# Patient Record
Sex: MALE | Race: WHITE | URBAN - METROPOLITAN AREA
[De-identification: names, ages, dates, MRNs, and addresses within clinical notes are randomized per-mention and may not be internally consistent; named-entity substitution may affect disease eponyms.]

---

## 2018-05-02 ENCOUNTER — OP HISTORICAL/CONVERTED ENCOUNTER (OUTPATIENT)
Dept: OTHER | Age: 68
End: 2018-05-02

## 2020-08-11 VITALS
OXYGEN SATURATION: 98 % | BODY MASS INDEX: 30.34 KG/M2 | SYSTOLIC BLOOD PRESSURE: 130 MMHG | WEIGHT: 224 LBS | RESPIRATION RATE: 12 BRPM | HEART RATE: 86 BPM | DIASTOLIC BLOOD PRESSURE: 90 MMHG | HEIGHT: 72 IN | TEMPERATURE: 97 F

## 2020-08-13 ENCOUNTER — OFFICE VISIT (OUTPATIENT)
Dept: PRIMARY CARE CLINIC | Age: 70
End: 2020-08-13
Payer: MEDICARE

## 2020-08-13 VITALS
OXYGEN SATURATION: 98 % | HEART RATE: 70 BPM | BODY MASS INDEX: 30.38 KG/M2 | HEIGHT: 72 IN | TEMPERATURE: 97.1 F | DIASTOLIC BLOOD PRESSURE: 82 MMHG | SYSTOLIC BLOOD PRESSURE: 144 MMHG

## 2020-08-13 DIAGNOSIS — E11.9 TYPE 2 DIABETES MELLITUS WITHOUT COMPLICATION, WITHOUT LONG-TERM CURRENT USE OF INSULIN (HCC): Primary | Chronic | ICD-10-CM

## 2020-08-13 DIAGNOSIS — E78.2 MIXED HYPERLIPIDEMIA: Chronic | ICD-10-CM

## 2020-08-13 DIAGNOSIS — I10 ESSENTIAL (PRIMARY) HYPERTENSION: Chronic | ICD-10-CM

## 2020-08-13 PROBLEM — E78.5 HYPERLIPIDEMIA: Status: ACTIVE | Noted: 2020-08-13

## 2020-08-13 PROCEDURE — 99214 OFFICE O/P EST MOD 30 MIN: CPT | Performed by: FAMILY MEDICINE

## 2020-08-13 RX ORDER — PETROLATUM,WHITE/LANOLIN
500 OINTMENT (GRAM) TOPICAL 3 TIMES DAILY
COMMUNITY

## 2020-08-13 RX ORDER — LOSARTAN POTASSIUM 50 MG/1
50 TABLET ORAL DAILY
COMMUNITY
End: 2020-08-13 | Stop reason: SDUPTHER

## 2020-08-13 RX ORDER — METFORMIN HYDROCHLORIDE 500 MG/1
500 TABLET ORAL DAILY
Qty: 90 TAB | Refills: 1 | Status: SHIPPED | OUTPATIENT
Start: 2020-08-13 | End: 2021-02-22 | Stop reason: SDUPTHER

## 2020-08-13 RX ORDER — ACYCLOVIR 200 MG/1
400 CAPSULE ORAL DAILY
Qty: 180 CAP | Refills: 3 | Status: SHIPPED | OUTPATIENT
Start: 2020-08-13 | End: 2022-05-25 | Stop reason: SDUPTHER

## 2020-08-13 RX ORDER — SIMVASTATIN 40 MG/1
40 TABLET, FILM COATED ORAL DAILY
COMMUNITY
End: 2020-08-13 | Stop reason: SDUPTHER

## 2020-08-13 RX ORDER — SIMVASTATIN 40 MG/1
40 TABLET, FILM COATED ORAL DAILY
Qty: 90 TAB | Refills: 1 | Status: SHIPPED | OUTPATIENT
Start: 2020-08-13 | End: 2021-02-22 | Stop reason: SDUPTHER

## 2020-08-13 RX ORDER — LOSARTAN POTASSIUM 100 MG/1
100 TABLET ORAL DAILY
Qty: 90 TAB | Refills: 1 | Status: SHIPPED | OUTPATIENT
Start: 2020-08-13 | End: 2021-02-22 | Stop reason: SDUPTHER

## 2020-08-13 RX ORDER — AMLODIPINE BESYLATE 10 MG/1
10 TABLET ORAL DAILY
COMMUNITY
End: 2020-08-13 | Stop reason: SDUPTHER

## 2020-08-13 RX ORDER — AMLODIPINE BESYLATE 10 MG/1
10 TABLET ORAL DAILY
Qty: 90 TAB | Refills: 1 | Status: SHIPPED | OUTPATIENT
Start: 2020-08-13 | End: 2021-02-22 | Stop reason: SDUPTHER

## 2020-08-13 RX ORDER — GUAIFENESIN 100 MG/5ML
81 LIQUID (ML) ORAL AS NEEDED
COMMUNITY

## 2020-08-13 RX ORDER — METFORMIN HYDROCHLORIDE 500 MG/1
500 TABLET ORAL DAILY
COMMUNITY
End: 2020-08-13 | Stop reason: SDUPTHER

## 2020-08-13 RX ORDER — ACYCLOVIR 200 MG/1
CAPSULE ORAL
COMMUNITY
End: 2020-08-13

## 2020-08-13 RX ORDER — VITAMIN E 268 MG
400 CAPSULE ORAL DAILY
COMMUNITY

## 2020-08-13 NOTE — PROGRESS NOTES
HISTORY OF PRESENT ILLNESS  Diabetes:  DM follow up appt  FBS   Last  Eye exam:2019, neg       PT is taking metformin 500 every day with dinner. There is no medication concerns. Denies any neurological symptoms , and cuts heal well. Checks feet at home. Hypertension:  PT taking norvasc 10 and losartan 100. Not checking blood pressure at home.       hyperlipidemia: On Zocor 40 mg, no myalgias, tolerating well. Been active and reports feeling well. Kj Junior is a 65 y.o. male. Past Medical History:   Diagnosis Date    Allergies     Anxiety     Arthritis     Spine     Diabetes [facility] [address]     GERD (gastroesophageal reflux disease)     Herpes     Hypertension     Prostate CA ([facility] [address]      Social History     Tobacco Use    Smoking status: Never Smoker    Smokeless tobacco: Never Used   Substance Use Topics    Alcohol use: Yes    Drug use: Not on file       Family History   Problem Relation Age of Onset    Diabetes Mother     Alzheimer Father     Coronary Artery Disease Other     Other Other        Review of Systems   Constitutional: Negative. Negative for chills, fever, malaise/fatigue and weight loss. Eyes: Negative for blurred vision and pain. Respiratory: Negative for cough and shortness of breath. Cardiovascular: Negative for chest pain, palpitations, orthopnea, leg swelling and PND. Gastrointestinal: Negative for abdominal pain, diarrhea, heartburn and nausea. Genitourinary: Negative for dysuria and frequency. Musculoskeletal: Negative for falls, joint pain and myalgias. Neurological: Negative for dizziness, tremors, weakness and headaches. Endo/Heme/Allergies: Negative for polydipsia. Psychiatric/Behavioral: Negative for depression. The patient is not nervous/anxious and does not have insomnia. Physical Exam  Vitals signs and nursing note reviewed. Constitutional:       General: He is not in acute distress.      Appearance: Normal appearance. He is obese. He is not ill-appearing. HENT:      Head: Normocephalic and atraumatic. Mouth/Throat:      Mouth: Mucous membranes are moist.      Pharynx: No oropharyngeal exudate or posterior oropharyngeal erythema. Eyes:      General: No scleral icterus. Right eye: No discharge. Left eye: No discharge. Extraocular Movements: Extraocular movements intact. Conjunctiva/sclera: Conjunctivae normal.      Pupils: Pupils are equal, round, and reactive to light. Neck:      Musculoskeletal: No muscular tenderness. Cardiovascular:      Rate and Rhythm: Normal rate and regular rhythm. Pulses: Normal pulses. Heart sounds: Normal heart sounds. Pulmonary:      Effort: Pulmonary effort is normal.      Breath sounds: Normal breath sounds. Abdominal:      General: Abdomen is flat. There is no distension. Palpations: Abdomen is soft. There is no mass. Tenderness: There is no abdominal tenderness. There is no right CVA tenderness or left CVA tenderness. Musculoskeletal:      Right lower leg: No edema. Left lower leg: No edema. Lymphadenopathy:      Cervical: No cervical adenopathy. Skin:     General: Skin is warm and dry. Capillary Refill: Capillary refill takes less than 2 seconds. Neurological:      General: No focal deficit present. Mental Status: He is alert and oriented to person, place, and time. Cranial Nerves: No cranial nerve deficit. Deep Tendon Reflexes: Reflexes normal.   Psychiatric:         Mood and Affect: Mood normal.         Behavior: Behavior normal.         Thought Content: Thought content normal.         Judgment: Judgment normal.           ASSESSMENT and PLAN  Diagnoses and all orders for this visit:    1. Type 2 diabetes mellitus without complication, without long-term current use of insulin ([facility])  Comments:  sounds controlled  Orders:  -     metFORMIN (GLUCOPHAGE) 500 mg tablet;  Take 1 Tab by mouth daily.  -     METABOLIC PANEL, COMPREHENSIVE  -     CBC WITH AUTOMATED DIFF  -     HEMOGLOBIN A1C WITH EAG  -     LIPID PANEL    2. Essential (primary) hypertension  Comments:  near goal.   Start tracking blood pressure at home. Goal less than 140/90  Orders:  -     losartan (COZAAR) 100 mg tablet; Take 1 Tab by mouth daily. Indications: high blood pressure  -     METABOLIC PANEL, COMPREHENSIVE  -     CBC WITH AUTOMATED DIFF  -     LIPID PANEL    3. Mixed hyperlipidemia  Comments: Tolerating well, due for labs  Orders:  -     amLODIPine (NORVASC) 10 mg tablet; Take 1 Tab by mouth daily. -     simvastatin (ZOCOR) 40 mg tablet; Take 1 Tab by mouth daily.  -     METABOLIC PANEL, COMPREHENSIVE  -     LIPID PANEL    Other orders  -     acyclovir (ZOVIRAX) 200 mg capsule; Take 2 Caps by mouth daily. Indications: a herpes simplex infection         Orders Placed This Encounter    METABOLIC PANEL, COMPREHENSIVE    CBC WITH AUTOMATED DIFF    HEMOGLOBIN A1C WITH EAG    LIPID PANEL    aspirin 81 mg chewable tablet    DISCONTD: metFORMIN (GLUCOPHAGE) 500 mg tablet    DISCONTD: losartan (COZAAR) 50 mg tablet    DISCONTD: amLODIPine (NORVASC) 10 mg tablet    glucosamine sulfate 500 mg capsule    omega 3-dha-epa-fish oil 100-160-1,000 mg cap    DISCONTD: simvastatin (ZOCOR) 40 mg tablet    vitamin E (AQUA GEMS) 400 unit capsule    amLODIPine (NORVASC) 10 mg tablet    losartan (COZAAR) 100 mg tablet    metFORMIN (GLUCOPHAGE) 500 mg tablet    simvastatin (ZOCOR) 40 mg tablet    DISCONTD: acyclovir (ZOVIRAX) 200 mg capsule    acyclovir (ZOVIRAX) 200 mg capsule     Follow-up and Dispositions    · Return in about 6 months (around 2/13/2021) for Chronic office visit.

## 2020-08-14 LAB
ALBUMIN SERPL-MCNC: 4.6 G/DL (ref 3.7–4.7)
ALBUMIN/GLOB SERPL: 2.2 {RATIO} (ref 1.2–2.2)
ALP SERPL-CCNC: 84 IU/L (ref 39–117)
ALT SERPL-CCNC: 20 IU/L (ref 0–44)
AST SERPL-CCNC: 17 IU/L (ref 0–40)
BASOPHILS # BLD AUTO: 0.1 X10E3/UL (ref 0–0.2)
BASOPHILS NFR BLD AUTO: 1 %
BILIRUB SERPL-MCNC: 0.2 MG/DL (ref 0–1.2)
BUN SERPL-MCNC: 14 MG/DL (ref 8–27)
BUN/CREAT SERPL: 15 (ref 10–24)
CALCIUM SERPL-MCNC: 9.2 MG/DL (ref 8.6–10.2)
CHLORIDE SERPL-SCNC: 104 MMOL/L (ref 96–106)
CHOLEST SERPL-MCNC: 179 MG/DL (ref 100–199)
CO2 SERPL-SCNC: 24 MMOL/L (ref 20–29)
CREAT SERPL-MCNC: 0.93 MG/DL (ref 0.76–1.27)
EOSINOPHIL # BLD AUTO: 0.1 X10E3/UL (ref 0–0.4)
EOSINOPHIL NFR BLD AUTO: 2 %
ERYTHROCYTE [DISTWIDTH] IN BLOOD BY AUTOMATED COUNT: 15.4 % (ref 11.6–15.4)
EST. AVERAGE GLUCOSE BLD GHB EST-MCNC: 128 MG/DL
GLOBULIN SER CALC-MCNC: 2.1 G/DL (ref 1.5–4.5)
GLUCOSE SERPL-MCNC: 87 MG/DL (ref 65–99)
HBA1C MFR BLD: 6.1 % (ref 4.8–5.6)
HCT VFR BLD AUTO: 42.1 % (ref 37.5–51)
HDLC SERPL-MCNC: 46 MG/DL
HGB BLD-MCNC: 13.4 G/DL (ref 13–17.7)
IMM GRANULOCYTES # BLD AUTO: 0 X10E3/UL (ref 0–0.1)
IMM GRANULOCYTES NFR BLD AUTO: 0 %
LDLC SERPL CALC-MCNC: 106 MG/DL (ref 0–99)
LYMPHOCYTES # BLD AUTO: 1.3 X10E3/UL (ref 0.7–3.1)
LYMPHOCYTES NFR BLD AUTO: 18 %
MCH RBC QN AUTO: 27.4 PG (ref 26.6–33)
MCHC RBC AUTO-ENTMCNC: 31.8 G/DL (ref 31.5–35.7)
MCV RBC AUTO: 86 FL (ref 79–97)
MONOCYTES # BLD AUTO: 0.5 X10E3/UL (ref 0.1–0.9)
MONOCYTES NFR BLD AUTO: 7 %
NEUTROPHILS # BLD AUTO: 5.4 X10E3/UL (ref 1.4–7)
NEUTROPHILS NFR BLD AUTO: 72 %
PLATELET # BLD AUTO: 359 X10E3/UL (ref 150–450)
POTASSIUM SERPL-SCNC: 4.6 MMOL/L (ref 3.5–5.2)
PROT SERPL-MCNC: 6.7 G/DL (ref 6–8.5)
RBC # BLD AUTO: 4.89 X10E6/UL (ref 4.14–5.8)
SODIUM SERPL-SCNC: 143 MMOL/L (ref 134–144)
TRIGL SERPL-MCNC: 133 MG/DL (ref 0–149)
VLDLC SERPL CALC-MCNC: 27 MG/DL (ref 5–40)
WBC # BLD AUTO: 7.4 X10E3/UL (ref 3.4–10.8)

## 2020-08-22 RX ORDER — SIMVASTATIN 40 MG/1
TABLET, FILM COATED ORAL
Qty: 90 TAB | OUTPATIENT
Start: 2020-08-22

## 2020-08-22 RX ORDER — AMLODIPINE BESYLATE 10 MG/1
TABLET ORAL
Qty: 90 TAB | OUTPATIENT
Start: 2020-08-22

## 2020-08-22 RX ORDER — LOSARTAN POTASSIUM 50 MG/1
TABLET ORAL
Qty: 90 TAB | OUTPATIENT
Start: 2020-08-22

## 2020-08-22 RX ORDER — METFORMIN HYDROCHLORIDE 500 MG/1
TABLET ORAL
Qty: 90 TAB | OUTPATIENT
Start: 2020-08-22

## 2021-02-22 ENCOUNTER — TELEPHONE (OUTPATIENT)
Dept: PRIMARY CARE CLINIC | Age: 71
End: 2021-02-22

## 2021-02-22 DIAGNOSIS — E11.9 TYPE 2 DIABETES MELLITUS WITHOUT COMPLICATION, WITHOUT LONG-TERM CURRENT USE OF INSULIN (HCC): Chronic | ICD-10-CM

## 2021-02-22 DIAGNOSIS — I10 ESSENTIAL (PRIMARY) HYPERTENSION: Chronic | ICD-10-CM

## 2021-02-22 DIAGNOSIS — E78.2 MIXED HYPERLIPIDEMIA: Chronic | ICD-10-CM

## 2021-02-22 RX ORDER — METFORMIN HYDROCHLORIDE 500 MG/1
500 TABLET ORAL DAILY
Qty: 30 TAB | Refills: 0 | Status: SHIPPED | OUTPATIENT
Start: 2021-02-22 | End: 2021-03-15 | Stop reason: SDUPTHER

## 2021-02-22 RX ORDER — AMLODIPINE BESYLATE 10 MG/1
10 TABLET ORAL DAILY
Qty: 30 TAB | Refills: 0 | Status: SHIPPED | OUTPATIENT
Start: 2021-02-22 | End: 2021-03-15 | Stop reason: SDUPTHER

## 2021-02-22 RX ORDER — LOSARTAN POTASSIUM 100 MG/1
100 TABLET ORAL DAILY
Qty: 30 TAB | Refills: 0 | Status: SHIPPED | OUTPATIENT
Start: 2021-02-22 | End: 2021-03-15 | Stop reason: SDUPTHER

## 2021-02-22 RX ORDER — SIMVASTATIN 40 MG/1
40 TABLET, FILM COATED ORAL DAILY
Qty: 30 TAB | Refills: 0 | Status: SHIPPED | OUTPATIENT
Start: 2021-02-22 | End: 2021-03-15 | Stop reason: SDUPTHER

## 2021-02-22 NOTE — TELEPHONE ENCOUNTER
Patient came to the window today and stated that he needs refills on his zocor glucophage  Amlodipine losartan. If there's any questions please call him at [phone].

## 2021-02-22 NOTE — TELEPHONE ENCOUNTER
No pharmacy requests were on chart. Last seen here 8/2020. Due for appt this month. 30 day supply of all 4 sent.

## 2021-02-22 NOTE — TELEPHONE ENCOUNTER
Sent. He needs an appointment.  If he can't do a virtual than he needs to schedule with a new provider because im sure Dr. Omar Rosales has no more in office avail

## 2021-03-15 ENCOUNTER — OFFICE VISIT (OUTPATIENT)
Dept: PRIMARY CARE CLINIC | Age: 71
End: 2021-03-15
Payer: MEDICARE

## 2021-03-15 VITALS
OXYGEN SATURATION: 98 % | TEMPERATURE: 97.3 F | DIASTOLIC BLOOD PRESSURE: 72 MMHG | RESPIRATION RATE: 16 BRPM | SYSTOLIC BLOOD PRESSURE: 130 MMHG | HEART RATE: 68 BPM | BODY MASS INDEX: 31.42 KG/M2 | HEIGHT: 72 IN | WEIGHT: 232 LBS

## 2021-03-15 DIAGNOSIS — M79.676 PAIN AROUND TOENAIL: Primary | ICD-10-CM

## 2021-03-15 DIAGNOSIS — E78.2 MIXED HYPERLIPIDEMIA: Chronic | ICD-10-CM

## 2021-03-15 DIAGNOSIS — Z11.59 ENCOUNTER FOR HEPATITIS C SCREENING TEST FOR LOW RISK PATIENT: ICD-10-CM

## 2021-03-15 DIAGNOSIS — E11.9 TYPE 2 DIABETES MELLITUS WITHOUT COMPLICATION, WITHOUT LONG-TERM CURRENT USE OF INSULIN (HCC): Chronic | ICD-10-CM

## 2021-03-15 DIAGNOSIS — I10 ESSENTIAL (PRIMARY) HYPERTENSION: Chronic | ICD-10-CM

## 2021-03-15 PROCEDURE — 1101F PT FALLS ASSESS-DOCD LE1/YR: CPT | Performed by: NURSE PRACTITIONER

## 2021-03-15 PROCEDURE — 99214 OFFICE O/P EST MOD 30 MIN: CPT | Performed by: NURSE PRACTITIONER

## 2021-03-15 PROCEDURE — 2022F DILAT RTA XM EVC RTNOPTHY: CPT | Performed by: NURSE PRACTITIONER

## 2021-03-15 PROCEDURE — 3017F COLORECTAL CA SCREEN DOC REV: CPT | Performed by: NURSE PRACTITIONER

## 2021-03-15 PROCEDURE — G8432 DEP SCR NOT DOC, RNG: HCPCS | Performed by: NURSE PRACTITIONER

## 2021-03-15 PROCEDURE — G8752 SYS BP LESS 140: HCPCS | Performed by: NURSE PRACTITIONER

## 2021-03-15 PROCEDURE — G8427 DOCREV CUR MEDS BY ELIG CLIN: HCPCS | Performed by: NURSE PRACTITIONER

## 2021-03-15 PROCEDURE — 3046F HEMOGLOBIN A1C LEVEL >9.0%: CPT | Performed by: NURSE PRACTITIONER

## 2021-03-15 PROCEDURE — G8754 DIAS BP LESS 90: HCPCS | Performed by: NURSE PRACTITIONER

## 2021-03-15 PROCEDURE — G8536 NO DOC ELDER MAL SCRN: HCPCS | Performed by: NURSE PRACTITIONER

## 2021-03-15 PROCEDURE — G8417 CALC BMI ABV UP PARAM F/U: HCPCS | Performed by: NURSE PRACTITIONER

## 2021-03-15 RX ORDER — METFORMIN HYDROCHLORIDE 500 MG/1
500 TABLET ORAL DAILY
Qty: 30 TAB | Refills: 0 | Status: SHIPPED | OUTPATIENT
Start: 2021-03-15 | End: 2021-04-15 | Stop reason: SDUPTHER

## 2021-03-15 RX ORDER — LOSARTAN POTASSIUM 100 MG/1
100 TABLET ORAL DAILY
Qty: 30 TAB | Refills: 0 | Status: SHIPPED | OUTPATIENT
Start: 2021-03-15 | End: 2021-04-15

## 2021-03-15 RX ORDER — SIMVASTATIN 40 MG/1
40 TABLET, FILM COATED ORAL DAILY
Qty: 30 TAB | Refills: 0 | Status: SHIPPED | OUTPATIENT
Start: 2021-03-15 | End: 2021-04-15 | Stop reason: SDUPTHER

## 2021-03-15 RX ORDER — AMLODIPINE BESYLATE 10 MG/1
10 TABLET ORAL DAILY
Qty: 30 TAB | Refills: 0 | Status: SHIPPED | OUTPATIENT
Start: 2021-03-15 | End: 2021-04-15 | Stop reason: SDUPTHER

## 2021-03-15 NOTE — PROGRESS NOTES
HISTORY OF PRESENT ILLNESS  Genie Garcia is a 65 y.o. male presents for   Chief Complaint   Patient presents with    Medication Refill    Toe Pain     Right big toe   Patient c/o right toenail pain that began two weeks ago, denies injury and has been using fungal nail ointment. Patient also here for medication refills. Patient BS at home has been 98, 105 highest it has ever been is 128. Home BP reading at home last week 140/70 or 132/70. Last Optho exam in Jan. 2021 - right eye pressure was elevated when he returned in Jan was told everything looked good and pressure is normal.     Patient is scheduled to receive his covid vaccine on 3/17/21 through [facility]. Vitals:    03/15/21 0942   BP: 130/72   BP 1 Location: Right arm   BP Patient Position: Sitting   Pulse: 68   Resp: 16   Temp: 97.3 °F (36.3 °C)   TempSrc: Temporal   SpO2: 98%   Weight: 232 lb (105.2 kg)   Height: 6' (1.829 m)      Patient Active Problem List   Diagnosis Code    Hyperlipidemia E78.5    Type 2 diabetes mellitus without complication ([facility] [address] N35.2    Essential (primary) hypertension I10     Patient Active Problem List    Diagnosis Date Noted    Hyperlipidemia 08/13/2020    Type 2 diabetes mellitus without complication ([facility] [address] 25/87/8803    Essential (primary) hypertension 08/13/2020     Current Outpatient Medications   Medication Sig Dispense Refill    amLODIPine (NORVASC) 10 mg tablet Take 1 Tab by mouth daily. 30 Tab 0    losartan (COZAAR) 100 mg tablet Take 1 Tab by mouth daily. Indications: high blood pressure 30 Tab 0    simvastatin (ZOCOR) 40 mg tablet Take 1 Tab by mouth daily. 30 Tab 0    metFORMIN (GLUCOPHAGE) 500 mg tablet Take 1 Tab by mouth daily. 30 Tab 0    aspirin 81 mg chewable tablet Take 81 mg by mouth as needed.  glucosamine sulfate 500 mg capsule Take 500 mg by mouth three (3) times daily.  omega 3-dha-epa-fish oil 100-160-1,000 mg cap Take 1 Tab by mouth daily.       vitamin E (AQUA GEMS) 400 unit capsule Take 400 Units by mouth daily.  acyclovir (ZOVIRAX) 200 mg capsule Take 2 Caps by mouth daily. Indications: a herpes simplex infection 180 Cap 3     No Known Allergies  Past Medical History:   Diagnosis Date    Allergies     Anxiety     Arthritis     Spine     Diabetes ([address]     GERD (gastroesophageal reflux disease)     Herpes     Hypertension     Prostate CA ([facility] [address]      Past Surgical History:   Procedure Laterality Date    HX APPENDECTOMY      HX CERVICAL FUSION      HX COLONOSCOPY      HX OTHER SURGICAL  2019    Shoulder surgery     HX OTHER SURGICAL  2019    neck surgery     Family History   Problem Relation Age of Onset    Diabetes Mother     Alzheimer Father     Coronary Artery Disease Other     Other Other      Social History     Tobacco Use    Smoking status: Never Smoker    Smokeless tobacco: Never Used   Substance Use Topics    Alcohol use: Yes           Review of Systems   Constitutional: Negative for chills, fever and malaise/fatigue. HENT: Negative for hearing loss, nosebleeds, sore throat and tinnitus. Eyes: Negative for blurred vision, double vision and pain. Respiratory: Negative for cough and shortness of breath. Cardiovascular: Positive for leg swelling (my right leg has been swelling not so much for years now). Negative for chest pain and palpitations. Gastrointestinal: Negative for diarrhea, nausea and vomiting. Genitourinary: Negative for dysuria and urgency. Musculoskeletal: Negative for back pain. Skin: Negative for rash. Neurological: Negative for dizziness, tremors, weakness and headaches. Endo/Heme/Allergies: Does not bruise/bleed easily. Psychiatric/Behavioral: Negative for depression and suicidal ideas. Physical Exam  Constitutional:       Appearance: Normal appearance. He is overweight. HENT:      Head: Normocephalic. Neck:      Musculoskeletal: Normal range of motion.    Cardiovascular:      Rate and Rhythm: Normal rate and regular rhythm. Pulses: Normal pulses. Heart sounds: Normal heart sounds. Pulmonary:      Effort: Pulmonary effort is normal.      Breath sounds: Normal breath sounds. Feet:      Right foot:      Toenail Condition: Right toenails are abnormally thick. Fungal disease present. Comments: Right great toenail \"buckled\"  with induration to nailbed. Neurological:      Mental Status: He is alert. ASSESSMENT and PLAN  Diagnoses and all orders for this visit:    1. Pain around toenail  -     REFERRAL TO PODIATRY    2. Mixed hyperlipidemia  Comments: Tolerating well, due for labs  Orders:  -     amLODIPine (NORVASC) 10 mg tablet; Take 1 Tab by mouth daily. -     simvastatin (ZOCOR) 40 mg tablet; Take 1 Tab by mouth daily.  -     LIPID PANEL AND CHOL/HDL RATIO    3. Type 2 diabetes mellitus without complication, without long-term current use of insulin ([facility])  Comments:  sounds controlled  Orders:  -     metFORMIN (GLUCOPHAGE) 500 mg tablet; Take 1 Tab by mouth daily.  -     CBC WITH AUTOMATED DIFF  -     METABOLIC PANEL, COMPREHENSIVE  -     HEMOGLOBIN A1C WITH EAG    4. Essential (primary) hypertension  Comments:  near goal.   Start tracking blood pressure at home. Goal less than 140/90  Orders:  -     losartan (COZAAR) 100 mg tablet; Take 1 Tab by mouth daily. Indications: high blood pressure  -     CBC WITH AUTOMATED DIFF  -     METABOLIC PANEL, COMPREHENSIVE    5. Encounter for hepatitis C screening test for low risk patient  -     HEPATITIS C AB       There are no diagnoses linked to this encounter.    Diane Nguyen     Signed By: Enzo Chirinos NP     March 15, 2021

## 2021-03-15 NOTE — PATIENT INSTRUCTIONS
Learning About Physical Activity What is physical activity? Physical activity is any kind of activity that gets your body moving. The types of physical activity that can help you get fit and stay healthy include: · Aerobic or \"cardio\" activities that make your heart beat faster and make you breathe harder, such as brisk walking, riding a bike, or running. Aerobic activities strengthen your heart and lungs and build up your endurance. · Strength training activities that make your muscles work against, or \"resist,\" something, such as lifting weights or doing push-ups. These activities help tone and strengthen your muscles. · Stretches that allow you to move your joints and muscles through their full range of motion. Stretching helps you be more flexible and avoid injury. What are the benefits of physical activity? Being active is one of the best things you can do for your health. It helps you to: · Feel stronger and have more energy to do all the things you like to do. · Focus better at school or work. · Feel, think, and sleep better. · Reach and stay at a healthy weight. · Lose fat and build lean muscle. · Lower your risk for serious health problems. · Keep your bones, muscles, and joints strong. How can you make physical activity part of your life? Get at least 30 minutes of exercise on most days of the week. Walking is a good choice. You also may want to do other activities, such as running, swimming, cycling, or playing tennis or team sports. Pick activities that you likeones that make your heart beat faster, your muscles stronger, and your muscles and joints more flexible. If you find more than one thing you like doing, do them all. You don't have to do the same thing every day. Get your heart pumping every day. Any activity that makes your heart beat faster and keeps it at that rate for a while counts. Here are some great ways to get your heart beating faster: · Go for a brisk walk, run, or bike ride. · Go for a hike or swim. · Go in-line skating. · Play a game of touch football, basketball, or soccer. · Ride a bike. · Play tennis or racquetball. · Climb stairs. Even some household chores can be aerobicjust do them at a faster pace. Vacuuming, raking or mowing the lawn, sweeping the garage, and washing and waxing the car all can help get your heart rate up. Strengthen your muscles during the week. You don't have to lift heavy weights or grow big, bulky muscles to get stronger. Doing a few simple activities that make your muscles work against, or \"resist,\" something can help you get stronger. For example, you can: · Do push-ups or sit-ups, which use your own body weight as resistance. · Lift weights or dumbbells or use stretch bands at home or in a gym or community center. Stretch your muscles often. Stretching will help you as you become more active. It can help you stay flexible, loosen tight muscles, and avoid injury. It can also help improve your balance and posture and can be a great way to relax. Be sure to stretch the muscles you'll be using when you work out. It's best to warm your muscles slightly before you stretch them. Walk or do some other light aerobic activity for a few minutes, and then start stretching. When you stretch your muscles: · Do it slowly. Stretching is not about going fast or making sudden movements. · Don't push or bounce during a stretch. · Hold each stretch for at least 15 to 30 seconds, if you can. You should feel a stretch in the muscle, but not pain. · Breathe out as you do the stretch. Then breathe in as you hold the stretch. Don't hold your breath. If you're worried about how more activity might affect your health, have a checkup before you start. Follow any special advice your doctor gives you for getting a smart start. Where can you learn more? Go to http://www.hogan.com/ Enter U018 in the search box to learn more about \"Learning About Physical Activity. \" Current as of: January 16, 2020               Content Version: 12.6 © 7154-4892 [employer]. Care instructions adapted under license by [employer] (which disclaims liability or warranty for this information). If you have questions about a medical condition or this instruction, always ask your healthcare professional. [address] any warranty or liability for your use of this information.

## 2021-03-16 ENCOUNTER — OFFICE VISIT (OUTPATIENT)
Dept: PODIATRY | Age: 71
End: 2021-03-16
Payer: MEDICARE

## 2021-03-16 VITALS
TEMPERATURE: 96.4 F | DIASTOLIC BLOOD PRESSURE: 77 MMHG | HEIGHT: 72 IN | SYSTOLIC BLOOD PRESSURE: 153 MMHG | WEIGHT: 229.8 LBS | BODY MASS INDEX: 31.13 KG/M2 | HEART RATE: 68 BPM | OXYGEN SATURATION: 98 %

## 2021-03-16 DIAGNOSIS — L60.0 INGROWN TOENAIL OF RIGHT FOOT: Primary | ICD-10-CM

## 2021-03-16 LAB
ALBUMIN SERPL-MCNC: 4.6 G/DL (ref 3.7–4.7)
ALBUMIN/GLOB SERPL: 2 {RATIO} (ref 1.2–2.2)
ALP SERPL-CCNC: 95 IU/L (ref 39–117)
ALT SERPL-CCNC: 18 IU/L (ref 0–44)
AST SERPL-CCNC: 17 IU/L (ref 0–40)
BASOPHILS # BLD AUTO: 0.1 X10E3/UL (ref 0–0.2)
BASOPHILS NFR BLD AUTO: 1 %
BILIRUB SERPL-MCNC: 0.3 MG/DL (ref 0–1.2)
BUN SERPL-MCNC: 13 MG/DL (ref 8–27)
BUN/CREAT SERPL: 15 (ref 10–24)
CALCIUM SERPL-MCNC: 9.1 MG/DL (ref 8.6–10.2)
CHLORIDE SERPL-SCNC: 105 MMOL/L (ref 96–106)
CHOLEST SERPL-MCNC: 188 MG/DL (ref 100–199)
CHOLEST/HDLC SERPL: 4.1 RATIO (ref 0–5)
CO2 SERPL-SCNC: 24 MMOL/L (ref 20–29)
CREAT SERPL-MCNC: 0.85 MG/DL (ref 0.76–1.27)
EOSINOPHIL # BLD AUTO: 0.3 X10E3/UL (ref 0–0.4)
EOSINOPHIL NFR BLD AUTO: 4 %
ERYTHROCYTE [DISTWIDTH] IN BLOOD BY AUTOMATED COUNT: 15.8 % (ref 11.6–15.4)
EST. AVERAGE GLUCOSE BLD GHB EST-MCNC: 123 MG/DL
GLOBULIN SER CALC-MCNC: 2.3 G/DL (ref 1.5–4.5)
GLUCOSE SERPL-MCNC: 104 MG/DL (ref 65–99)
HBA1C MFR BLD: 5.9 % (ref 4.8–5.6)
HCT VFR BLD AUTO: 40.2 % (ref 37.5–51)
HCV AB S/CO SERPL IA: <0.1 S/CO RATIO (ref 0–0.9)
HDLC SERPL-MCNC: 46 MG/DL
HGB BLD-MCNC: 13.2 G/DL (ref 13–17.7)
IMM GRANULOCYTES # BLD AUTO: 0 X10E3/UL (ref 0–0.1)
IMM GRANULOCYTES NFR BLD AUTO: 1 %
LDLC SERPL CALC-MCNC: 105 MG/DL (ref 0–99)
LYMPHOCYTES # BLD AUTO: 1 X10E3/UL (ref 0.7–3.1)
LYMPHOCYTES NFR BLD AUTO: 17 %
MCH RBC QN AUTO: 26.8 PG (ref 26.6–33)
MCHC RBC AUTO-ENTMCNC: 32.8 G/DL (ref 31.5–35.7)
MCV RBC AUTO: 82 FL (ref 79–97)
MONOCYTES # BLD AUTO: 0.6 X10E3/UL (ref 0.1–0.9)
MONOCYTES NFR BLD AUTO: 11 %
NEUTROPHILS # BLD AUTO: 4 X10E3/UL (ref 1.4–7)
NEUTROPHILS NFR BLD AUTO: 66 %
PLATELET # BLD AUTO: 368 X10E3/UL (ref 150–450)
POTASSIUM SERPL-SCNC: 4.9 MMOL/L (ref 3.5–5.2)
PROT SERPL-MCNC: 6.9 G/DL (ref 6–8.5)
RBC # BLD AUTO: 4.93 X10E6/UL (ref 4.14–5.8)
SODIUM SERPL-SCNC: 141 MMOL/L (ref 134–144)
TRIGL SERPL-MCNC: 217 MG/DL (ref 0–149)
VLDLC SERPL CALC-MCNC: 37 MG/DL (ref 5–40)
WBC # BLD AUTO: 6 X10E3/UL (ref 3.4–10.8)

## 2021-03-16 PROCEDURE — 1101F PT FALLS ASSESS-DOCD LE1/YR: CPT | Performed by: PODIATRIST

## 2021-03-16 PROCEDURE — G8510 SCR DEP NEG, NO PLAN REQD: HCPCS | Performed by: PODIATRIST

## 2021-03-16 PROCEDURE — 99203 OFFICE O/P NEW LOW 30 MIN: CPT | Performed by: PODIATRIST

## 2021-03-16 PROCEDURE — G8753 SYS BP > OR = 140: HCPCS | Performed by: PODIATRIST

## 2021-03-16 PROCEDURE — 3017F COLORECTAL CA SCREEN DOC REV: CPT | Performed by: PODIATRIST

## 2021-03-16 PROCEDURE — G8427 DOCREV CUR MEDS BY ELIG CLIN: HCPCS | Performed by: PODIATRIST

## 2021-03-16 PROCEDURE — 11730 AVULSION NAIL PLATE SIMPLE 1: CPT | Performed by: PODIATRIST

## 2021-03-16 PROCEDURE — G8536 NO DOC ELDER MAL SCRN: HCPCS | Performed by: PODIATRIST

## 2021-03-16 PROCEDURE — G8417 CALC BMI ABV UP PARAM F/U: HCPCS | Performed by: PODIATRIST

## 2021-03-16 PROCEDURE — G8754 DIAS BP LESS 90: HCPCS | Performed by: PODIATRIST

## 2021-03-16 RX ORDER — SILVER SULFADIAZINE 10 G/1000G
CREAM TOPICAL DAILY
Qty: 25 G | Refills: 0 | Status: SHIPPED | OUTPATIENT
Start: 2021-03-16

## 2021-03-16 NOTE — PROGRESS NOTES
Please call patient about labs. Generally speaking your labs are pretty good even better than last time, continue, sir, on your current trajectory.

## 2021-03-16 NOTE — PROGRESS NOTES
Chief Complaint   Patient presents with    Diabetic Foot Exam     A1C BS- PT states pcp called yesterday and said everything was good and to keep doing what he was doing    Nail Problem     States R great toe nail hurts and is growing down into skin states it hurts when he wears shoes     1. Have you been to the ER, urgent care clinic since your last visit? Hospitalized since your last visit? No    2. Have you seen or consulted any other health care providers outside of the [address] [place] [address] since your last visit? Include any pap smears or colon screening.  No  PCP-Dr Stephan Marie

## 2021-03-22 NOTE — PROGRESS NOTES
[facility]    Subjective:         Patient is a 65 y.o. male who is being seen as a new pt for pain to his right big toenail. He believes he may have an ingrown toenail to the area. He states the pain is 10 out of 10, localized to the right big toe and described as sharp in nature. He states close toed shoes and ambulation exacerbate the pain. He states he is attempted to soak his foot in Epsom salt and warm water without relief of his symptoms. He denies any recent trauma. She denies any systemic signs of infection. He denies any other pedal complaints    Past Medical History:   Diagnosis Date    Allergies     Anxiety     Arthritis     Spine     Diabetes ([facility] [address]     GERD (gastroesophageal reflux disease)     Herpes     Hypertension     Prostate CA [facility])      Past Surgical History:   Procedure Laterality Date    HX APPENDECTOMY      HX CERVICAL FUSION      HX COLONOSCOPY      HX OTHER SURGICAL  2019    Shoulder surgery     HX OTHER SURGICAL  2019    neck surgery       Family History   Problem Relation Age of Onset    Diabetes Mother     Alzheimer Father     Coronary Artery Disease Other     Other Other       Social History     Tobacco Use    Smoking status: Never Smoker    Smokeless tobacco: Never Used   Substance Use Topics    Alcohol use: Yes     No Known Allergies  Prior to Admission medications    Medication Sig Start Date End Date Taking? Authorizing Provider   silver sulfADIAZINE (SILVADENE) 1 % topical cream Apply  to affected area daily. 3/16/21  Yes Toña Yuen DPM   amLODIPine (NORVASC) 10 mg tablet Take 1 Tab by mouth daily. 3/15/21  Yes Wing Rodrigues NP   metFORMIN (GLUCOPHAGE) 500 mg tablet Take 1 Tab by mouth daily. 3/15/21  Yes Wing Rodrigues NP   losartan (COZAAR) 100 mg tablet Take 1 Tab by mouth daily.  Indications: high blood pressure 3/15/21  Yes Clem Payne NP   simvastatin (ZOCOR) 40 mg tablet Take 1 Tab by mouth daily. 3/15/21  Yes Chivo Waldrop NP   aspirin 81 mg chewable tablet Take 81 mg by mouth as needed. Yes Provider, Historical   glucosamine sulfate 500 mg capsule Take 500 mg by mouth three (3) times daily. Yes Provider, Historical   omega 3-dha-epa-fish oil 100-160-1,000 mg cap Take 1 Tab by mouth daily. Yes Provider, Historical   vitamin E (AQUA GEMS) 400 unit capsule Take 400 Units by mouth daily. Yes Provider, Historical   acyclovir (ZOVIRAX) 200 mg capsule Take 2 Caps by mouth daily. Indications: a herpes simplex infection 8/13/20  Yes Marco Antonio Esquivel MD       Review of Systems   Constitutional: Negative. HENT: Negative. Eyes: Negative. Respiratory: Negative. Cardiovascular: Negative. Gastrointestinal: Negative. Endocrine: Negative. Genitourinary: Negative. Musculoskeletal: Negative. Skin: Negative. Allergic/Immunologic: Positive for immunocompromised state. Neurological: Negative. Hematological: Negative. Psychiatric/Behavioral: Negative. All other systems reviewed and are negative. Objective:     Visit Vitals  BP (!) 153/77 (BP 1 Location: Left upper arm, BP Patient Position: Sitting, BP Cuff Size: Small adult)   Pulse 68   Temp (!) 96.4 °F (35.8 °C) (Temporal)   Ht 6' (1.829 m)   Wt 229 lb 12.8 oz (104.2 kg)   SpO2 98%   BMI 31.17 kg/m²       Physical Exam  Vitals signs reviewed. Constitutional:       Appearance: He is obese. Cardiovascular:      Pulses:           Dorsalis pedis pulses are 2+ on the right side and 2+ on the left side. Posterior tibial pulses are 2+ on the right side and 2+ on the left side. Pulmonary:      Effort: Pulmonary effort is normal.   Musculoskeletal:      Right lower leg: No edema. Left lower leg: No edema. Right foot: Normal range of motion. No deformity or bunion. Left foot: Normal range of motion. No deformity or bunion. Feet:      Right foot:      Protective Sensation: 10 sites tested. 10 sites sensed. Skin integrity: Erythema present. Toenail Condition: Right toenails are ingrown. Left foot:      Protective Sensation: 10 sites tested. 10 sites sensed. Skin integrity: Skin integrity normal.      Toenail Condition: Left toenails are normal.   Lymphadenopathy:      Lower Body: No right inguinal adenopathy. No left inguinal adenopathy. Skin:     General: Skin is warm. Capillary Refill: Capillary refill takes 2 to 3 seconds. Neurological:      Mental Status: He is alert and oriented to person, place, and time. Psychiatric:         Mood and Affect: Mood and affect normal.         Behavior: Behavior is cooperative. Data Review: No results found for this or any previous visit (from the past 24 hour(s)). Impression:       ICD-10-CM ICD-9-CM    1. Ingrown toenail of right foot  L60.0 703.0          Recommendation:     Patient seen and evaluated in the office  Discussed and educated patient regarding his current medical condition  Patient elected for a partial nail avulsion to the offending nail border of the right big toe. The digit was anesthetized with 10 cc of 2% lidocaine plain. The digit was scrubbed and draped in sterile fashion and a partial nail avulsion was performed the offending nail border. Patient tolerated well and appropriate dressing was applied. Patient was given a prescription for Silvadene topical antibiotic cream to be applied daily. He was also instructed to perform Epsom salt and warm water soaks for 15 minutes each day.   He is to monitor and call the office immediately if condition worsens

## 2021-04-13 ENCOUNTER — TELEPHONE (OUTPATIENT)
Dept: PRIMARY CARE CLINIC | Age: 71
End: 2021-04-13

## 2021-04-13 NOTE — TELEPHONE ENCOUNTER
Patient came into the office requesting refills on his medications:     Amlodipine 10 mg tab  Metformin 500 mg tab  Losartan 100 mg tab  Simvistatin 40 mg tab

## 2021-04-14 DIAGNOSIS — I10 ESSENTIAL (PRIMARY) HYPERTENSION: Chronic | ICD-10-CM

## 2021-04-14 DIAGNOSIS — E11.9 TYPE 2 DIABETES MELLITUS WITHOUT COMPLICATION, WITHOUT LONG-TERM CURRENT USE OF INSULIN (HCC): Chronic | ICD-10-CM

## 2021-04-14 DIAGNOSIS — E78.2 MIXED HYPERLIPIDEMIA: Chronic | ICD-10-CM

## 2021-04-15 RX ORDER — LOSARTAN POTASSIUM 100 MG/1
TABLET ORAL
Qty: 90 TAB | Refills: 1 | Status: SHIPPED | OUTPATIENT
Start: 2021-04-15 | End: 2021-11-16 | Stop reason: SDUPTHER

## 2021-04-15 RX ORDER — AMLODIPINE BESYLATE 10 MG/1
10 TABLET ORAL DAILY
Qty: 90 TAB | Refills: 1 | Status: SHIPPED | OUTPATIENT
Start: 2021-04-15 | End: 2021-11-16 | Stop reason: SDUPTHER

## 2021-04-15 RX ORDER — METFORMIN HYDROCHLORIDE 500 MG/1
500 TABLET ORAL DAILY
Qty: 90 TAB | Refills: 1 | Status: SHIPPED | OUTPATIENT
Start: 2021-04-15 | End: 2021-11-16 | Stop reason: SDUPTHER

## 2021-04-15 RX ORDER — SIMVASTATIN 40 MG/1
40 TABLET, FILM COATED ORAL DAILY
Qty: 90 TAB | Refills: 1 | Status: SHIPPED | OUTPATIENT
Start: 2021-04-15 | End: 2021-11-16 | Stop reason: SDUPTHER

## 2021-11-16 DIAGNOSIS — I10 ESSENTIAL (PRIMARY) HYPERTENSION: Chronic | ICD-10-CM

## 2021-11-16 DIAGNOSIS — E11.9 TYPE 2 DIABETES MELLITUS WITHOUT COMPLICATION, WITHOUT LONG-TERM CURRENT USE OF INSULIN (HCC): Chronic | ICD-10-CM

## 2021-11-16 DIAGNOSIS — E78.2 MIXED HYPERLIPIDEMIA: Chronic | ICD-10-CM

## 2021-11-16 RX ORDER — METFORMIN HYDROCHLORIDE 500 MG/1
500 TABLET ORAL DAILY
Qty: 90 TABLET | Refills: 1 | Status: SHIPPED | OUTPATIENT
Start: 2021-11-16 | End: 2022-05-01

## 2021-11-16 RX ORDER — LOSARTAN POTASSIUM 100 MG/1
100 TABLET ORAL DAILY
Qty: 90 TABLET | Refills: 1 | Status: SHIPPED | OUTPATIENT
Start: 2021-11-16 | End: 2021-11-24 | Stop reason: ALTCHOICE

## 2021-11-16 RX ORDER — AMLODIPINE BESYLATE 10 MG/1
10 TABLET ORAL DAILY
Qty: 90 TABLET | Refills: 1 | Status: SHIPPED | OUTPATIENT
Start: 2021-11-16 | End: 2022-05-01

## 2021-11-16 RX ORDER — SIMVASTATIN 40 MG/1
40 TABLET, FILM COATED ORAL DAILY
Qty: 90 TABLET | Refills: 1 | Status: SHIPPED | OUTPATIENT
Start: 2021-11-16 | End: 2022-05-01

## 2021-11-24 ENCOUNTER — OFFICE VISIT (OUTPATIENT)
Dept: PRIMARY CARE CLINIC | Age: 71
End: 2021-11-24
Payer: MEDICARE

## 2021-11-24 VITALS
TEMPERATURE: 97.6 F | OXYGEN SATURATION: 98 % | BODY MASS INDEX: 29.93 KG/M2 | DIASTOLIC BLOOD PRESSURE: 78 MMHG | HEART RATE: 78 BPM | SYSTOLIC BLOOD PRESSURE: 138 MMHG | HEIGHT: 72 IN | WEIGHT: 221 LBS | RESPIRATION RATE: 18 BRPM

## 2021-11-24 DIAGNOSIS — E11.9 TYPE 2 DIABETES MELLITUS WITHOUT COMPLICATION, WITHOUT LONG-TERM CURRENT USE OF INSULIN (HCC): ICD-10-CM

## 2021-11-24 DIAGNOSIS — I10 ESSENTIAL (PRIMARY) HYPERTENSION: ICD-10-CM

## 2021-11-24 DIAGNOSIS — Z00.00 MEDICARE ANNUAL WELLNESS VISIT, SUBSEQUENT: Primary | ICD-10-CM

## 2021-11-24 DIAGNOSIS — Z13.31 DEPRESSION SCREEN: ICD-10-CM

## 2021-11-24 DIAGNOSIS — E78.2 MIXED HYPERLIPIDEMIA: ICD-10-CM

## 2021-11-24 PROCEDURE — 1101F PT FALLS ASSESS-DOCD LE1/YR: CPT | Performed by: FAMILY MEDICINE

## 2021-11-24 PROCEDURE — G8536 NO DOC ELDER MAL SCRN: HCPCS | Performed by: FAMILY MEDICINE

## 2021-11-24 PROCEDURE — G8754 DIAS BP LESS 90: HCPCS | Performed by: FAMILY MEDICINE

## 2021-11-24 PROCEDURE — G8510 SCR DEP NEG, NO PLAN REQD: HCPCS | Performed by: FAMILY MEDICINE

## 2021-11-24 PROCEDURE — G8417 CALC BMI ABV UP PARAM F/U: HCPCS | Performed by: FAMILY MEDICINE

## 2021-11-24 PROCEDURE — 3044F HG A1C LEVEL LT 7.0%: CPT | Performed by: FAMILY MEDICINE

## 2021-11-24 PROCEDURE — 2022F DILAT RTA XM EVC RTNOPTHY: CPT | Performed by: FAMILY MEDICINE

## 2021-11-24 PROCEDURE — 3017F COLORECTAL CA SCREEN DOC REV: CPT | Performed by: FAMILY MEDICINE

## 2021-11-24 PROCEDURE — G8752 SYS BP LESS 140: HCPCS | Performed by: FAMILY MEDICINE

## 2021-11-24 PROCEDURE — G0439 PPPS, SUBSEQ VISIT: HCPCS | Performed by: FAMILY MEDICINE

## 2021-11-24 PROCEDURE — 99214 OFFICE O/P EST MOD 30 MIN: CPT | Performed by: FAMILY MEDICINE

## 2021-11-24 PROCEDURE — G8427 DOCREV CUR MEDS BY ELIG CLIN: HCPCS | Performed by: FAMILY MEDICINE

## 2021-11-24 RX ORDER — LOSARTAN POTASSIUM AND HYDROCHLOROTHIAZIDE 12.5; 1 MG/1; MG/1
1 TABLET ORAL DAILY
Qty: 90 TABLET | Refills: 1 | Status: SHIPPED | OUTPATIENT
Start: 2021-11-24 | End: 2022-05-01

## 2021-11-24 NOTE — PROGRESS NOTES
Chuck  1 [facility], [address] [place] [address]  [phone]    Date of visit: 11/24/2021       This is a Subsequent Medicare Annual Wellness Visit (AWV), (Performed more than 12 months after effective date of Medicare Part B enrollment and 12 months after last preventive visit.)    I have reviewed the patient's medical history in detail and updated the computerized patient record. History obtained from: the patient. male  66 y.o. WHITE/NON-  Depression Risk Factor Screening:     3 most recent PHQ Screens 11/24/2021   Little interest or pleasure in doing things Not at all   Feeling down, depressed, irritable, or hopeless Not at all   Total Score PHQ 2 0          Fall Risk Factor Screening:     Fall Risk Assessment, last 12 mths 11/24/2021   Able to walk? Yes   Fall in past 12 months? 0   Do you feel unsteady? 0   Are you worried about falling 0       Alcohol Risk Screen    Do you average more than 1 drink per night or more than 7 drinks a week: No    In the past three months have you have had more than 4 drinks containing alcohol on one occasion: No         Functional Ability and Level of Safety:    Hearing: Hearing is good. Activities of Daily Living: The home contains: no safety equipment. Patient does total self care      Ambulation: with no difficulty      Abuse Screen:  Patient is not abused         Histories     Reviewed PmHx, FmHx, SocHx as well as meds and allergies, updated and dated in the chart.     Patient Active Problem List   Diagnosis Code    Hyperlipidemia E78.5    Type 2 diabetes mellitus without complication ([address] O15.1    Essential (primary) hypertension I10    Ingrown toenail of right foot L60.0     Past Medical History:   Diagnosis Date    Allergies     Anxiety     Arthritis     Spine     Diabetes ([address]     GERD (gastroesophageal reflux disease)     Herpes     Hypertension     Prostate CA [facility])       Past Surgical History: Procedure Laterality Date    HX APPENDECTOMY      HX CERVICAL FUSION      HX COLONOSCOPY      HX OTHER SURGICAL  2019    Shoulder surgery     HX OTHER SURGICAL  2019    neck surgery     No Known Allergies  Current Outpatient Medications   Medication Sig Dispense Refill    losartan-hydroCHLOROthiazide (HYZAAR) 100-12.5 mg per tablet Take 1 Tablet by mouth daily. 90 Tablet 1    amLODIPine (NORVASC) 10 mg tablet Take 1 Tablet by mouth daily. 90 Tablet 1    metFORMIN (GLUCOPHAGE) 500 mg tablet Take 1 Tablet by mouth daily. 90 Tablet 1    simvastatin (ZOCOR) 40 mg tablet Take 1 Tablet by mouth daily. 90 Tablet 1    silver sulfADIAZINE (SILVADENE) 1 % topical cream Apply  to affected area daily. 25 g 0    aspirin 81 mg chewable tablet Take 81 mg by mouth as needed.  glucosamine sulfate 500 mg capsule Take 500 mg by mouth three (3) times daily.  omega 3-dha-epa-fish oil 100-160-1,000 mg cap Take 1 Tab by mouth daily.  vitamin E (AQUA GEMS) 400 unit capsule Take 400 Units by mouth daily.  acyclovir (ZOVIRAX) 200 mg capsule Take 2 Caps by mouth daily. Indications: a herpes simplex infection 180 Cap 3     Family History   Problem Relation Age of Onset    Diabetes Mother     Alzheimer's Disease Father     Coronary Art Dis Other     Other Other      Social History     Tobacco Use    Smoking status: Never Smoker    Smokeless tobacco: Never Used   Substance Use Topics    Alcohol use: Yes       Current Complaints and Pertinent Exam   If patient is due for chronic medical conditions and/or has acute concerns in addition to the medicare wellness visit, they have been informed there may be a copay for the additional services provided, and agree to do both. ROS & Physical Exam: see acute note as applicable. Diet and Exercise: Admits diet needs improvement. Not much exercise.      BP Readings from Last 3 Encounters:   11/24/21 138/78   03/16/21 (!) 153/77   03/15/21 130/72      Wt Readings from Last 3 Encounters:   11/24/21 221 lb (100.2 kg)   03/16/21 229 lb 12.8 oz (104.2 kg)   03/15/21 232 lb (105.2 kg)     Body mass index is 29.97 kg/m². No exam data present    Was the patient's timed Up & Go test unsteady or longer than 30 seconds? no    Evaluation of Cognitive Function   Mood/affect:  happy  Orientation: Person, Place, Time and Situation  Appearance: age appropriate and well dressed  Family member/caregiver input: n/a    Specialists/Care Team   David Denise Cheryl has established care with the following healthcare providers:  Patient Care Team:  Alicia Calderon MD as PCP - General (Family Medicine)  Alicia Calderon MD as PCP - [address] [place] [address]  [facility] Provider  Kenny San MD as Physician (Dermatology)  Vince Chen MD as Physician (Urology)     [facility] Maintenance Topics with due status: Overdue       Topic Date Due    Foot Exam Q1 Never done    Eye Exam Retinal or Dilated Never done    Colorectal Cancer Screening Combo Never done    MICROALBUMIN Q1 07/18/2020    Shingrix Vaccine Age 50> 12/15/2020     Health Maintenance Topics with due status: Not Due       Topic Last Completion Date    DTaP/Tdap/Td series 02/02/2017    A1C test (Diabetic or Prediabetic) 03/15/2021    Lipid Screen 03/15/2021    Medicare Yearly Exam 11/24/2021     Health Maintenance Topics with due status: Completed       Topic Last Completion Date    Pneumococcal 65+ years 03/22/2017    Hepatitis C Screening 03/15/2021    Flu Vaccine 09/01/2021    COVID-19 Vaccine 10/27/2021         Colon cancer:  Recommendation: Colonoscopy every 10y or annual FIT test from 50-75 or every 3 year stool DNA based test with consideration of ongoing screening from 76-85. Lung cancer (LDCT): Recommendation: Yearly LDCT for pts 55-77 w 30-pack year hx and currently smoke or quit <15 yr ago. Hepatitis C:  Recommendation: One time screening for all patient's aged 18-79.     Diabetes:   Recommendation: USPSTF recommends screening ages 38-67 y/o if overweight or obese. Medicare covers screening in those patients who are overweight, obese, have HTN or dyslipiemia, a personal history of gestational diabetes or prior elevated blood sugar, a family hx of DM, or any patient over age 72    Lipids:   Recommendation: screening for hyperlipidemia every 5 years after age 39      PREVENTIVE CARE - MALE SCREENINGS     Prostate cancer: Recommendation: Consider PSA screening every 2-4 yr from age 53-78 after review of pros and cons. Medicare covers annual PSA screening. and Followed by Urology. AAA:   Recommendation: One-time screening if family history of AAA or smoking history of at least 100 cigarettes lifetime      IMMUNIZATIONS     Immunization History   Administered Date(s) Administered    COVID-19, PFIZER, MRNA, LNP-S, PF, 30MCG/0.3ML DOSE 03/18/2021, 04/14/2021, 10/27/2021    Influenza High Dose Vaccine PF 09/01/2021    Influenza Vaccine 10/01/2018, 08/30/2019    Pneumococcal Polysaccharide (PPSV-23) 11/18/2013, 03/22/2017    Zoster Recombinant 10/20/2020       Pneumovax:   Recommendation: PPSV23 once for all >65 and high risk <65  and Up to date or Completed    Prevnar:   Recommendation: PCV13 only if >65 and immunocompromised or residing in a nursing home, or in areas of low childhood Pneumococcal vaccination and Not Indicated    Influenza:   Recommendation: Vaccination annually, high dose if 65 or older and Up to date or Completed    Shingrix:  Recommendation: Vaccination 2 shots 2-6 months apart for all age >47    TDaP:    Recommendation: Vaccination Booster with TDaP every 10 yr. Discussion of Advance Directive   Discussed with Milla valdez ability to prepare and advance directive in the case that an injury or illness causes him to be unable to make health care decisions.      Date of ACP Conversation: 11/24/21  Persons included in Conversation:  patient  Length of ACP Conversation in minutes:  <16 minutes (Non-Billable)    Authorized Decision Maker (if patient is incapable of making informed decisions): This person is:   Next of Kin by law (only applies in absence of a Healthcare Power of  or Legal Guardian)      Primary Decision Maker: Dwight Watson - [phone]        For Patients with Decision Making Capacity:   Values/Goals: Exploration of values, goals, and preferences if recovery is not expected, even with continued medical treatment in the event of:  Imminent death  Severe, permanent brain injury  \"In these circumstances, what matters most to you? \"  Care focused more on quantity (length) of life. Conversation Outcomes / Follow-Up Plan:   ACP in process - information provided, considering goals and options    Assessment/Plan       Diagnoses and all orders for this visit:    1. Medicare annual wellness visit, subsequent  Comments:  Updating HM. Discussed ACP with patient. 2. Type 2 diabetes mellitus without complication, without long-term current use of insulin ([address]  Comments:  Checking labs. Continue meds as they are. Orders:  -     CBC W/O DIFF  -     METABOLIC PANEL, COMPREHENSIVE  -     HEMOGLOBIN A1C WITH EAG  -     LIPID PANEL  -     MICROALBUMIN, UR, RAND W/ MICROALB/CREAT RATIO    3. Essential (primary) hypertension  Comments:  Given reported home BPs adding HCTZ 12.5mg to losartan. Orders:  -     CBC W/O DIFF  -     losartan-hydroCHLOROthiazide (HYZAAR) 100-12.5 mg per tablet; Take 1 Tablet by mouth daily. 4. Mixed hyperlipidemia  Comments:  Is fasting. Continue statin, chekcing labs. Orders:  -     METABOLIC PANEL, COMPREHENSIVE  -     LIPID PANEL    5. Depression screen  Comments:  Negative. Orders:  -     [place] DEPRESSION SCREEN ANNUAL          Follow-up and Dispositions    · Return in about 6 months (around 5/24/2022) for chronic follow up.   2025 Catalina Cruz MD  [facility]  [address]

## 2021-11-24 NOTE — PROGRESS NOTES
HPI     Chief Complaint:   Chief Complaint   Patient presents with    Follow-up     DM        HPI:  Chanel Man is a 66 y.o. male. T2DM non insulin dependent: Patient does not bring a log. Denies polyuria or polydipsia. Lab Results   Component Value Date/Time    Hemoglobin A1c 5.9 (H) 03/15/2021 11:00 AM      Diabetes Checklist  ACE inhibitor: Taking  Last Lipid panel: Current  Statin: Taking  Last Microalbumin: Due  Pneumonia vaccine 19-64?:  Up-to-date  Last seen by opthalmology: Need records    HTN: Blood pressure initially elevated, decreased on repeat. He does report blood pressures of 140s to 150s over 90s at home. Complaint with medications. No cough or chest pain. HLD: Compliant with statin, no myalgias. He admits that his diet could use some improvement. Lab Results   Component Value Date/Time    Cholesterol, total 188 03/15/2021 11:00 AM    HDL Cholesterol 46 03/15/2021 11:00 AM    LDL, calculated 105 (H) 03/15/2021 11:00 AM    LDL, calculated 106 (H) 08/13/2020 03:57 PM    VLDL, calculated 37 03/15/2021 11:00 AM    VLDL, calculated 27 08/13/2020 03:57 PM    Triglyceride 217 (H) 03/15/2021 11:00 AM        Review of Systems   Constitutional: Negative for chills and fever. Respiratory: Negative for cough and shortness of breath. Cardiovascular: Negative for chest pain and palpitations. Psychiatric/Behavioral: Negative for hallucinations and substance abuse. Reviewed PmHx, FmHx, SocHx as well as meds and allergies, updated and dated in the chart. Past Medical History:   Diagnosis Date    Allergies     Anxiety     Arthritis     Spine     Diabetes ([facility]     GERD (gastroesophageal reflux disease)     Herpes     Hypertension     Prostate CA ([facility]      Social History     Tobacco Use    Smoking status: Never Smoker    Smokeless tobacco: Never Used   Vaping Use    Vaping Use: Never used   Substance Use Topics    Alcohol use:  Yes    Drug use: Not on file Current Outpatient Medications on File Prior to Visit   Medication Sig Dispense Refill    amLODIPine (NORVASC) 10 mg tablet Take 1 Tablet by mouth daily. 90 Tablet 1    metFORMIN (GLUCOPHAGE) 500 mg tablet Take 1 Tablet by mouth daily. 90 Tablet 1    simvastatin (ZOCOR) 40 mg tablet Take 1 Tablet by mouth daily. 90 Tablet 1    silver sulfADIAZINE (SILVADENE) 1 % topical cream Apply  to affected area daily. 25 g 0    aspirin 81 mg chewable tablet Take 81 mg by mouth as needed.  glucosamine sulfate 500 mg capsule Take 500 mg by mouth three (3) times daily.  omega 3-dha-epa-fish oil 100-160-1,000 mg cap Take 1 Tab by mouth daily.  vitamin E (AQUA GEMS) 400 unit capsule Take 400 Units by mouth daily.  acyclovir (ZOVIRAX) 200 mg capsule Take 2 Caps by mouth daily. Indications: a herpes simplex infection 180 Cap 3    [DISCONTINUED] losartan (COZAAR) 100 mg tablet Take 1 Tablet by mouth daily. FOR HIGH BLOOD PRESSURE 90 Tablet 1     No current facility-administered medications on file prior to visit. No Known Allergies       Objective     Visit Vitals  /78   Pulse 78   Temp 97.6 °F (36.4 °C) (Temporal)   Resp 18   Ht 6' (1.829 m)   Wt 221 lb (100.2 kg)   SpO2 98%   BMI 29.97 kg/m²     Physical Exam  Vitals and nursing note reviewed. Constitutional:       General: He is not in acute distress. Appearance: Normal appearance. HENT:      Head: Normocephalic and atraumatic. Cardiovascular:      Rate and Rhythm: Normal rate and regular rhythm. Heart sounds: No murmur heard. Pulmonary:      Effort: Pulmonary effort is normal. No respiratory distress. Musculoskeletal:         General: Normal range of motion. Right lower leg: No edema. Left lower leg: No edema. Skin:     General: Skin is warm. Neurological:      General: No focal deficit present. Mental Status: He is alert and oriented to person, place, and time.             Assessment and Plan Diagnoses and all orders for this visit:    1. Medicare annual wellness visit, subsequent  Comments:  Updating HM. Discussed ACP with patient. 2. Type 2 diabetes mellitus without complication, without long-term current use of insulin ([address]  Comments:  Checking labs. Continue meds as they are. Orders:  -     CBC W/O DIFF  -     METABOLIC PANEL, COMPREHENSIVE  -     HEMOGLOBIN A1C WITH EAG  -     LIPID PANEL  -     MICROALBUMIN, UR, RAND W/ MICROALB/CREAT RATIO    3. Essential (primary) hypertension  Comments:  Given reported home BPs adding HCTZ 12.5mg to losartan. Orders:  -     CBC W/O DIFF  -     losartan-hydroCHLOROthiazide (HYZAAR) 100-12.5 mg per tablet; Take 1 Tablet by mouth daily. 4. Mixed hyperlipidemia  Comments:  Is fasting. Continue statin, chekcing labs. Orders:  -     METABOLIC PANEL, COMPREHENSIVE  -     LIPID PANEL    5. Depression screen  Comments:  Negative. Orders:  -     [place] DEPRESSION SCREEN ANNUAL        Medication Side Effects and Warnings were discussed with patient, as indicated. Patient Labs were reviewed and or requested, as indicated. Patient Past Records were reviewed and or requested, as indicated. Follow-up and Dispositions    · Return in about 6 months (around 5/24/2022) for chronic follow up.   Jte Wild MD  [facility]  [address]

## 2021-11-24 NOTE — PROGRESS NOTES
Visit Vitals  BP (!) 165/84 (BP 1 Location: Left upper arm, BP Patient Position: Sitting, BP Cuff Size: Large adult)   Pulse 78   Temp 97.6 °F (36.4 °C) (Temporal)   Resp 18   Ht 6' (1.829 m)   Wt 221 lb (100.2 kg)   SpO2 98%   BMI 29.97 kg/m²     Chief Complaint   Patient presents with    Follow-up     DM     1. Have you been to the ER, urgent care clinic since your last visit? Hospitalized since your last visit? No    2. Have you seen or consulted any other health care providers outside of the [address] [place] [address] since your last visit? Include any pap smears or colon screening.  No

## 2021-11-25 LAB
ALBUMIN SERPL-MCNC: 4.5 G/DL (ref 3.7–4.7)
ALBUMIN/CREAT UR: 6 MG/G CREAT (ref 0–29)
ALBUMIN/GLOB SERPL: 2 {RATIO} (ref 1.2–2.2)
ALP SERPL-CCNC: 82 IU/L (ref 44–121)
ALT SERPL-CCNC: 15 IU/L (ref 0–44)
AST SERPL-CCNC: 16 IU/L (ref 0–40)
BILIRUB SERPL-MCNC: 0.3 MG/DL (ref 0–1.2)
BUN SERPL-MCNC: 16 MG/DL (ref 8–27)
BUN/CREAT SERPL: 19 (ref 10–24)
CALCIUM SERPL-MCNC: 9 MG/DL (ref 8.6–10.2)
CHLORIDE SERPL-SCNC: 104 MMOL/L (ref 96–106)
CHOLEST SERPL-MCNC: 174 MG/DL (ref 100–199)
CO2 SERPL-SCNC: 24 MMOL/L (ref 20–29)
CREAT SERPL-MCNC: 0.84 MG/DL (ref 0.76–1.27)
CREAT UR-MCNC: 202.8 MG/DL
ERYTHROCYTE [DISTWIDTH] IN BLOOD BY AUTOMATED COUNT: 16 % (ref 11.6–15.4)
EST. AVERAGE GLUCOSE BLD GHB EST-MCNC: 131 MG/DL
GLOBULIN SER CALC-MCNC: 2.3 G/DL (ref 1.5–4.5)
GLUCOSE SERPL-MCNC: 99 MG/DL (ref 65–99)
HBA1C MFR BLD: 6.2 % (ref 4.8–5.6)
HCT VFR BLD AUTO: 41.4 % (ref 37.5–51)
HDLC SERPL-MCNC: 63 MG/DL
HGB BLD-MCNC: 13 G/DL (ref 13–17.7)
LDLC SERPL CALC-MCNC: 94 MG/DL (ref 0–99)
MCH RBC QN AUTO: 26.4 PG (ref 26.6–33)
MCHC RBC AUTO-ENTMCNC: 31.4 G/DL (ref 31.5–35.7)
MCV RBC AUTO: 84 FL (ref 79–97)
MICROALBUMIN UR-MCNC: 11.5 UG/ML
PLATELET # BLD AUTO: 376 X10E3/UL (ref 150–450)
POTASSIUM SERPL-SCNC: 4.6 MMOL/L (ref 3.5–5.2)
PROT SERPL-MCNC: 6.8 G/DL (ref 6–8.5)
RBC # BLD AUTO: 4.93 X10E6/UL (ref 4.14–5.8)
SODIUM SERPL-SCNC: 140 MMOL/L (ref 134–144)
TRIGL SERPL-MCNC: 94 MG/DL (ref 0–149)
VLDLC SERPL CALC-MCNC: 17 MG/DL (ref 5–40)
WBC # BLD AUTO: 6 X10E3/UL (ref 3.4–10.8)

## 2021-12-01 NOTE — PROGRESS NOTES
Called patient no answer. Overall blood counts normal.   All other labs stable/WNL. Diabetes well controlled.

## 2022-03-19 PROBLEM — L60.0 INGROWN TOENAIL OF RIGHT FOOT: Status: ACTIVE | Noted: 2021-03-16

## 2022-03-19 PROBLEM — I10 ESSENTIAL (PRIMARY) HYPERTENSION: Status: ACTIVE | Noted: 2020-08-13

## 2022-03-19 PROBLEM — E78.5 HYPERLIPIDEMIA: Status: ACTIVE | Noted: 2020-08-13

## 2022-03-19 PROBLEM — E11.9 TYPE 2 DIABETES MELLITUS WITHOUT COMPLICATION (HCC): Status: ACTIVE | Noted: 2020-08-13

## 2022-05-01 DIAGNOSIS — I10 ESSENTIAL (PRIMARY) HYPERTENSION: ICD-10-CM

## 2022-05-01 DIAGNOSIS — E11.9 TYPE 2 DIABETES MELLITUS WITHOUT COMPLICATION, WITHOUT LONG-TERM CURRENT USE OF INSULIN (HCC): Chronic | ICD-10-CM

## 2022-05-01 DIAGNOSIS — E78.2 MIXED HYPERLIPIDEMIA: Chronic | ICD-10-CM

## 2022-05-01 RX ORDER — LOSARTAN POTASSIUM AND HYDROCHLOROTHIAZIDE 12.5; 1 MG/1; MG/1
1 TABLET ORAL DAILY
Qty: 90 TABLET | Refills: 1 | Status: SHIPPED | OUTPATIENT
Start: 2022-05-01

## 2022-05-01 RX ORDER — METFORMIN HYDROCHLORIDE 500 MG/1
500 TABLET ORAL DAILY
Qty: 90 TABLET | Refills: 1 | Status: SHIPPED | OUTPATIENT
Start: 2022-05-01

## 2022-05-01 RX ORDER — SIMVASTATIN 40 MG/1
40 TABLET, FILM COATED ORAL DAILY
Qty: 90 TABLET | Refills: 1 | Status: SHIPPED | OUTPATIENT
Start: 2022-05-01

## 2022-05-01 RX ORDER — AMLODIPINE BESYLATE 10 MG/1
10 TABLET ORAL DAILY
Qty: 90 TABLET | Refills: 1 | Status: SHIPPED | OUTPATIENT
Start: 2022-05-01

## 2022-05-25 ENCOUNTER — OFFICE VISIT (OUTPATIENT)
Dept: PRIMARY CARE CLINIC | Age: 72
End: 2022-05-25
Payer: MEDICARE

## 2022-05-25 VITALS
WEIGHT: 219 LBS | HEART RATE: 64 BPM | RESPIRATION RATE: 20 BRPM | SYSTOLIC BLOOD PRESSURE: 129 MMHG | OXYGEN SATURATION: 99 % | DIASTOLIC BLOOD PRESSURE: 70 MMHG | HEIGHT: 72 IN | BODY MASS INDEX: 29.66 KG/M2 | TEMPERATURE: 97.4 F

## 2022-05-25 DIAGNOSIS — A60.00 HERPES SIMPLEX INFECTION OF GENITOURINARY SYSTEM: ICD-10-CM

## 2022-05-25 DIAGNOSIS — E11.9 TYPE 2 DIABETES MELLITUS WITHOUT COMPLICATION, WITHOUT LONG-TERM CURRENT USE OF INSULIN (HCC): Primary | ICD-10-CM

## 2022-05-25 DIAGNOSIS — E78.2 MIXED HYPERLIPIDEMIA: Chronic | ICD-10-CM

## 2022-05-25 DIAGNOSIS — D64.9 ANEMIA, UNSPECIFIED TYPE: ICD-10-CM

## 2022-05-25 DIAGNOSIS — I10 ESSENTIAL (PRIMARY) HYPERTENSION: ICD-10-CM

## 2022-05-25 PROCEDURE — 2022F DILAT RTA XM EVC RTNOPTHY: CPT | Performed by: FAMILY MEDICINE

## 2022-05-25 PROCEDURE — G8427 DOCREV CUR MEDS BY ELIG CLIN: HCPCS | Performed by: FAMILY MEDICINE

## 2022-05-25 PROCEDURE — G8752 SYS BP LESS 140: HCPCS | Performed by: FAMILY MEDICINE

## 2022-05-25 PROCEDURE — 99214 OFFICE O/P EST MOD 30 MIN: CPT | Performed by: FAMILY MEDICINE

## 2022-05-25 PROCEDURE — 1101F PT FALLS ASSESS-DOCD LE1/YR: CPT | Performed by: FAMILY MEDICINE

## 2022-05-25 PROCEDURE — G8754 DIAS BP LESS 90: HCPCS | Performed by: FAMILY MEDICINE

## 2022-05-25 PROCEDURE — 3017F COLORECTAL CA SCREEN DOC REV: CPT | Performed by: FAMILY MEDICINE

## 2022-05-25 PROCEDURE — G8536 NO DOC ELDER MAL SCRN: HCPCS | Performed by: FAMILY MEDICINE

## 2022-05-25 PROCEDURE — G8510 SCR DEP NEG, NO PLAN REQD: HCPCS | Performed by: FAMILY MEDICINE

## 2022-05-25 PROCEDURE — 3046F HEMOGLOBIN A1C LEVEL >9.0%: CPT | Performed by: FAMILY MEDICINE

## 2022-05-25 PROCEDURE — G8417 CALC BMI ABV UP PARAM F/U: HCPCS | Performed by: FAMILY MEDICINE

## 2022-05-25 PROCEDURE — 1123F ACP DISCUSS/DSCN MKR DOCD: CPT | Performed by: FAMILY MEDICINE

## 2022-05-25 RX ORDER — ACYCLOVIR 200 MG/1
400 CAPSULE ORAL DAILY
Qty: 180 CAPSULE | Refills: 3 | Status: SHIPPED | OUTPATIENT
Start: 2022-05-25

## 2022-05-25 RX ORDER — ACYCLOVIR 200 MG/1
400 CAPSULE ORAL DAILY
Qty: 180 CAPSULE | Refills: 3 | OUTPATIENT
Start: 2022-05-25

## 2022-05-25 NOTE — PROGRESS NOTES
HPI     Chief Complaint:   Chief Complaint   Patient presents with    Follow-up        HPI:  Mechelle Wong is a 66 y.o. male with a history of T2DM, HTN, HLD, Herpes Simplex. T2DM non insulin dependent: Patient does not bring a log. Denies polyuria or polydipsia. Lab Results   Component Value Date/Time    Hemoglobin A1c 6.2 (H) 11/24/2021 08:32 AM      Diabetes Checklist  ACE inhibitor: Taking  Last Lipid panel: Current  Statin: Taking  Last Microalbumin: Due  Pneumonia vaccine 19-64?:  Up-to-date  Last seen by opthalmology: Need records    HTN: Blood pressure initially elevated, decreased on repeat. He does report blood pressures of 140s to 150s over 90s at home. Complaint with medications. No cough or chest pain. HLD: Compliant with statin, no myalgias. He admits that his diet could use some improvement. Lab Results   Component Value Date/Time    Cholesterol, total 174 11/24/2021 08:32 AM    HDL Cholesterol 63 11/24/2021 08:32 AM    LDL, calculated 94 11/24/2021 08:32 AM    LDL, calculated 106 (H) 08/13/2020 03:57 PM    VLDL, calculated 17 11/24/2021 08:32 AM    VLDL, calculated 27 08/13/2020 03:57 PM    Triglyceride 94 11/24/2021 08:32 AM        Review of Systems   Constitutional: Negative for chills and fever. Respiratory: Negative for cough and shortness of breath. Cardiovascular: Negative for chest pain and palpitations. Musculoskeletal: Negative for back pain and myalgias. Psychiatric/Behavioral: Negative for hallucinations and substance abuse. Reviewed PmHx, FmHx, SocHx as well as meds and allergies, updated and dated in the chart.     Past Medical History:   Diagnosis Date    Allergies     Anxiety     Arthritis     Spine     Diabetes ([facility]     GERD (gastroesophageal reflux disease)     Herpes     Hypertension     Prostate CA ([facility]      Social History     Tobacco Use    Smoking status: Never Smoker    Smokeless tobacco: Never Used   Vaping Use    Vaping Use: Never used   Substance Use Topics    Alcohol use: Yes    Drug use: Not on file       Current Outpatient Medications on File Prior to Visit   Medication Sig Dispense Refill    simvastatin (ZOCOR) 40 mg tablet TAKE 1 TABLET BY MOUTH DAILY 90 Tablet 1    metFORMIN (GLUCOPHAGE) 500 mg tablet TAKE 1 TABLET BY MOUTH DAILY 90 Tablet 1    amLODIPine (NORVASC) 10 mg tablet TAKE 1 TABLET BY MOUTH DAILY 90 Tablet 1    losartan-hydroCHLOROthiazide (HYZAAR) 100-12.5 mg per tablet TAKE 1 TABLET BY MOUTH DAILY 90 Tablet 1    silver sulfADIAZINE (SILVADENE) 1 % topical cream Apply  to affected area daily. 25 g 0    aspirin 81 mg chewable tablet Take 81 mg by mouth as needed.  glucosamine sulfate 500 mg capsule Take 500 mg by mouth three (3) times daily.  omega 3-dha-epa-fish oil 100-160-1,000 mg cap Take 1 Tab by mouth daily.  vitamin E (AQUA GEMS) 400 unit capsule Take 400 Units by mouth daily.  [DISCONTINUED] acyclovir (ZOVIRAX) 200 mg capsule Take 2 Caps by mouth daily. Indications: a herpes simplex infection 180 Cap 3     No current facility-administered medications on file prior to visit. No Known Allergies       Objective     Visit Vitals  /70 (BP 1 Location: Left upper arm, BP Patient Position: Standing, BP Cuff Size: Large adult)   Pulse 64   Temp 97.4 °F (36.3 °C) (Temporal)   Resp 20   Ht 6' (1.829 m)   Wt 219 lb (99.3 kg)   SpO2 99%   BMI 29.70 kg/m²     Physical Exam  Vitals and nursing note reviewed. Constitutional:       General: He is not in acute distress. Appearance: Normal appearance. HENT:      Head: Normocephalic and atraumatic. Cardiovascular:      Rate and Rhythm: Normal rate and regular rhythm. Heart sounds: No murmur heard. Pulmonary:      Effort: Pulmonary effort is normal. No respiratory distress. Musculoskeletal:         General: Normal range of motion. Right lower leg: No edema. Left lower leg: No edema. Skin:     General: Skin is warm. Neurological:      General: No focal deficit present. Mental Status: He is alert and oriented to person, place, and time. Left:                Filament test normal sensation with micro filament               Pulse DP: 2+                Deformities: No fungal toenail infection, wounds, corns or calluses. Right:     Filament test normal sensation with micro filament               Pulse DP: 2+                Deformities: No fungal toenail infection, wounds, corns or calluses. Assessment and Plan     Diagnoses and all orders for this visit:    1. Type 2 diabetes mellitus without complication, without long-term current use of insulin ([address]  Comments:  Has been well controlled on current regimen. Checking labs today. Orders:  -     CBC W/O DIFF  -     HEMOGLOBIN A1C WITH EAG  -     MICROALBUMIN, UR, RAND W/ MICROALB/CREAT RATIO  -     HM DIABETES FOOT EXAM    2. Essential (primary) hypertension  Comments: At goal.  Continue current regimen. Orders:  -     METABOLIC PANEL, COMPREHENSIVE    3. Mixed hyperlipidemia  Comments: Tolerating simvastatin well, due for labs. Orders:  -     CBC W/O DIFF  -     LIPID PANEL  -     METABOLIC PANEL, COMPREHENSIVE    4. Herpes simplex infection of genitourinary system  Comments:  Has been successfully suppressed on daily acyclovir use. Counseled on appropriate use during flares (400mg TID for 5 days). Refills provided. Orders:  -     acyclovir (ZOVIRAX) 200 mg capsule; Take 2 Capsules by mouth daily. Indications: a herpes simplex infection        Medication Side Effects and Warnings were discussed with patient, as indicated. Patient Labs were reviewed and or requested, as indicated. Patient Past Records were reviewed and or requested, as indicated. Follow-up and Dispositions    · Return in about 6 months (around 11/25/2022) for annual physical, chronic follow up.   Routing History  Follow-up and Disposition History            Kaleigh John MD  [facility] [address] [facility]

## 2022-05-25 NOTE — PROGRESS NOTES
1. \"Have you been to the ER, urgent care clinic since your last visit? Hospitalized since your last visit? \" No    2. \"Have you seen or consulted any other health care providers outside of the [address] [place] [address] since your last visit? \" No     3. For patients aged 39-70: Has the patient had a colonoscopy / FIT/ Cologuard? No      If the patient is female:    4. For patients aged 41-77: Has the patient had a mammogram within the past 2 years? NA - based on age or sex      11. For patients aged 21-65: Has the patient had a pap smear?  NA - based on age or sex  Visit Vitals  /70 (BP 1 Location: Left upper arm, BP Patient Position: Standing, BP Cuff Size: Large adult)   Pulse 64   Temp 97.4 °F (36.3 °C) (Temporal)   Resp 20   Ht 6' (1.829 m)   Wt 219 lb (99.3 kg)   SpO2 99%   BMI 29.70 kg/m²     Chief Complaint   Patient presents with    Follow-up

## 2022-05-26 LAB
ALBUMIN SERPL-MCNC: 4.5 G/DL (ref 3.7–4.7)
ALBUMIN/CREAT UR: 2 MG/G CREAT (ref 0–29)
ALBUMIN/GLOB SERPL: 2.3 {RATIO} (ref 1.2–2.2)
ALP SERPL-CCNC: 76 IU/L (ref 44–121)
ALT SERPL-CCNC: 16 IU/L (ref 0–44)
AST SERPL-CCNC: 17 IU/L (ref 0–40)
BILIRUB SERPL-MCNC: 0.3 MG/DL (ref 0–1.2)
BUN SERPL-MCNC: 19 MG/DL (ref 8–27)
BUN/CREAT SERPL: 19 (ref 10–24)
CALCIUM SERPL-MCNC: 9.2 MG/DL (ref 8.6–10.2)
CHLORIDE SERPL-SCNC: 102 MMOL/L (ref 96–106)
CHOLEST SERPL-MCNC: 164 MG/DL (ref 100–199)
CO2 SERPL-SCNC: 24 MMOL/L (ref 20–29)
CREAT SERPL-MCNC: 1.01 MG/DL (ref 0.76–1.27)
CREAT UR-MCNC: 176.2 MG/DL
EGFR: 79 ML/MIN/1.73
ERYTHROCYTE [DISTWIDTH] IN BLOOD BY AUTOMATED COUNT: 15.1 % (ref 11.6–15.4)
EST. AVERAGE GLUCOSE BLD GHB EST-MCNC: 126 MG/DL
GLOBULIN SER CALC-MCNC: 2 G/DL (ref 1.5–4.5)
GLUCOSE SERPL-MCNC: 106 MG/DL (ref 65–99)
HBA1C MFR BLD: 6 % (ref 4.8–5.6)
HCT VFR BLD AUTO: 39.5 % (ref 37.5–51)
HDLC SERPL-MCNC: 59 MG/DL
HGB BLD-MCNC: 12.5 G/DL (ref 13–17.7)
LDLC SERPL CALC-MCNC: 87 MG/DL (ref 0–99)
MCH RBC QN AUTO: 26.9 PG (ref 26.6–33)
MCHC RBC AUTO-ENTMCNC: 31.6 G/DL (ref 31.5–35.7)
MCV RBC AUTO: 85 FL (ref 79–97)
MICROALBUMIN UR-MCNC: 4.3 UG/ML
PLATELET # BLD AUTO: 355 X10E3/UL (ref 150–450)
POTASSIUM SERPL-SCNC: 4.4 MMOL/L (ref 3.5–5.2)
PROT SERPL-MCNC: 6.5 G/DL (ref 6–8.5)
RBC # BLD AUTO: 4.64 X10E6/UL (ref 4.14–5.8)
SODIUM SERPL-SCNC: 139 MMOL/L (ref 134–144)
TRIGL SERPL-MCNC: 97 MG/DL (ref 0–149)
VLDLC SERPL CALC-MCNC: 18 MG/DL (ref 5–40)
WBC # BLD AUTO: 8.4 X10E3/UL (ref 3.4–10.8)

## 2022-05-26 NOTE — PROGRESS NOTES
Please call patient. Diabetes is stable. LDL, the bad cholesterol, is slightly above goal of 70. Please work on [employer] Cut back on fried foods processed, red meat, pork. He remains elevated we will need to start cholesterol medication. He is anemic. This is new. Has he had a colonoscopy? If not he needs want to be sure this is not due to a growth in his colon. I am adding iron studies to evaluate for this and we will call him with those results as well.     Dr. Sana Dubois

## 2022-11-05 DIAGNOSIS — E11.9 TYPE 2 DIABETES MELLITUS WITHOUT COMPLICATION, WITHOUT LONG-TERM CURRENT USE OF INSULIN (HCC): Chronic | ICD-10-CM

## 2022-11-05 DIAGNOSIS — E78.2 MIXED HYPERLIPIDEMIA: Chronic | ICD-10-CM

## 2022-11-05 DIAGNOSIS — I10 ESSENTIAL (PRIMARY) HYPERTENSION: ICD-10-CM

## 2022-11-08 RX ORDER — LOSARTAN POTASSIUM AND HYDROCHLOROTHIAZIDE 12.5; 1 MG/1; MG/1
1 TABLET ORAL DAILY
Qty: 90 TABLET | Refills: 1 | Status: SHIPPED | OUTPATIENT
Start: 2022-11-08

## 2022-11-08 RX ORDER — SIMVASTATIN 40 MG/1
40 TABLET, FILM COATED ORAL DAILY
Qty: 90 TABLET | Refills: 1 | Status: SHIPPED | OUTPATIENT
Start: 2022-11-08

## 2022-11-08 RX ORDER — METFORMIN HYDROCHLORIDE 500 MG/1
500 TABLET ORAL DAILY
Qty: 90 TABLET | Refills: 1 | Status: SHIPPED | OUTPATIENT
Start: 2022-11-08

## 2022-11-08 RX ORDER — AMLODIPINE BESYLATE 10 MG/1
10 TABLET ORAL DAILY
Qty: 90 TABLET | Refills: 1 | Status: SHIPPED | OUTPATIENT
Start: 2022-11-08

## 2022-12-06 ENCOUNTER — TELEPHONE (OUTPATIENT)
Dept: PRIMARY CARE CLINIC | Age: 72
End: 2022-12-06

## 2022-12-06 NOTE — TELEPHONE ENCOUNTER
----- Message from German Day sent at 12/6/2022 11:52 AM EST -----  Subject: Appointment Request    Reason for Call: Established Patient Appointment needed: Semi-Routine   Cough, Cold Symptoms    QUESTIONS    Reason for appointment request? No appointments available during search     Additional Information for Provider? patient called do having a cough sore   throat and diarrhea and would like meds prescribed  ---------------------------------------------------------------------------  --------------  Yael LORENZO  [phone]; OK to leave message on voicemail  ---------------------------------------------------------------------------  --------------  SCRIPT ANSWERS  COVID Screen: Red

## 2022-12-28 ENCOUNTER — OFFICE VISIT (OUTPATIENT)
Dept: PRIMARY CARE CLINIC | Age: 72
End: 2022-12-28
Payer: MEDICARE

## 2022-12-28 VITALS
HEIGHT: 72 IN | BODY MASS INDEX: 29.26 KG/M2 | SYSTOLIC BLOOD PRESSURE: 116 MMHG | DIASTOLIC BLOOD PRESSURE: 70 MMHG | OXYGEN SATURATION: 98 % | RESPIRATION RATE: 20 BRPM | WEIGHT: 216 LBS | HEART RATE: 72 BPM | TEMPERATURE: 97.2 F

## 2022-12-28 DIAGNOSIS — Z23 ENCOUNTER FOR IMMUNIZATION: ICD-10-CM

## 2022-12-28 DIAGNOSIS — E11.9 TYPE 2 DIABETES MELLITUS WITHOUT COMPLICATION, WITHOUT LONG-TERM CURRENT USE OF INSULIN (HCC): ICD-10-CM

## 2022-12-28 DIAGNOSIS — Z00.00 MEDICARE ANNUAL WELLNESS VISIT, SUBSEQUENT: Primary | ICD-10-CM

## 2022-12-28 DIAGNOSIS — E78.2 MIXED HYPERLIPIDEMIA: ICD-10-CM

## 2022-12-28 DIAGNOSIS — I10 ESSENTIAL (PRIMARY) HYPERTENSION: ICD-10-CM

## 2022-12-28 PROCEDURE — 3078F DIAST BP <80 MM HG: CPT | Performed by: FAMILY MEDICINE

## 2022-12-28 PROCEDURE — G0439 PPPS, SUBSEQ VISIT: HCPCS | Performed by: FAMILY MEDICINE

## 2022-12-28 PROCEDURE — 3074F SYST BP LT 130 MM HG: CPT | Performed by: FAMILY MEDICINE

## 2022-12-28 PROCEDURE — G8510 SCR DEP NEG, NO PLAN REQD: HCPCS | Performed by: FAMILY MEDICINE

## 2022-12-28 PROCEDURE — G8536 NO DOC ELDER MAL SCRN: HCPCS | Performed by: FAMILY MEDICINE

## 2022-12-28 PROCEDURE — 1123F ACP DISCUSS/DSCN MKR DOCD: CPT | Performed by: FAMILY MEDICINE

## 2022-12-28 PROCEDURE — 1101F PT FALLS ASSESS-DOCD LE1/YR: CPT | Performed by: FAMILY MEDICINE

## 2022-12-28 PROCEDURE — G8417 CALC BMI ABV UP PARAM F/U: HCPCS | Performed by: FAMILY MEDICINE

## 2022-12-28 PROCEDURE — 2022F DILAT RTA XM EVC RTNOPTHY: CPT | Performed by: FAMILY MEDICINE

## 2022-12-28 PROCEDURE — 3017F COLORECTAL CA SCREEN DOC REV: CPT | Performed by: FAMILY MEDICINE

## 2022-12-28 PROCEDURE — G8427 DOCREV CUR MEDS BY ELIG CLIN: HCPCS | Performed by: FAMILY MEDICINE

## 2022-12-28 PROCEDURE — 3044F HG A1C LEVEL LT 7.0%: CPT | Performed by: FAMILY MEDICINE

## 2022-12-28 NOTE — PROGRESS NOTES
HPI     Chief Complaint:   Chief Complaint   Patient presents with    Follow Up Chronic Condition        HPI:  Krystle Acevedo is a 74 y.o. male with a history of T2DM, HTN, HLD, Herpes Simplex. T2DM non insulin dependent: Last A1c below. No polydipsia. Lab Results   Component Value Date/Time    Hemoglobin A1c 6.0 (H) 05/25/2022 08:42 AM      Diabetes Checklist  ACE inhibitor: Taking  Last Lipid panel: Current  Statin: simvastatin  Last Microalbumin: Due  Pneumonia vaccine 19-64?:  Up-to-date  Last seen by opthalmology: UTD    HTN: BP at goal. No palpitations. Compliant with medications. HLD: Compliant with statin, no myalgias. Lab Results   Component Value Date/Time    Cholesterol, total 164 05/25/2022 08:42 AM    HDL Cholesterol 59 05/25/2022 08:42 AM    LDL, calculated 87 05/25/2022 08:42 AM    LDL, calculated 106 (H) 08/13/2020 03:57 PM    VLDL, calculated 18 05/25/2022 08:42 AM    VLDL, calculated 27 08/13/2020 03:57 PM    Triglyceride 97 05/25/2022 08:42 AM        Review of Systems   Constitutional:  Negative for chills and fever. Respiratory:  Negative for cough and shortness of breath. Cardiovascular:  Negative for chest pain and palpitations. Musculoskeletal:  Negative for back pain and myalgias. Endo/Heme/Allergies:  Negative for polydipsia. Does not bruise/bleed easily. Psychiatric/Behavioral:  Negative for hallucinations and substance abuse. Reviewed PmHx, FmHx, SocHx as well as meds and allergies, updated and dated in the chart.     Past Medical History:   Diagnosis Date    Allergies     Anxiety     Arthritis     Spine     Diabetes ([facility] [address]     GERD (gastroesophageal reflux disease)     Herpes     Hypertension     Prostate CA ([facility] [address]      Social History     Tobacco Use    Smoking status: Never    Smokeless tobacco: Never   Vaping Use    Vaping Use: Never used   Substance Use Topics    Alcohol use: Yes       Current Outpatient Medications on File Prior to Visit   Medication Sig Dispense Refill    simvastatin (ZOCOR) 40 mg tablet TAKE 1 TABLET BY MOUTH DAILY 90 Tablet 1    amLODIPine (NORVASC) 10 mg tablet TAKE 1 TABLET BY MOUTH DAILY 90 Tablet 1    losartan-hydroCHLOROthiazide (HYZAAR) 100-12.5 mg per tablet TAKE 1 TABLET BY MOUTH DAILY 90 Tablet 1    metFORMIN (GLUCOPHAGE) 500 mg tablet TAKE 1 TABLET BY MOUTH DAILY 90 Tablet 1    acyclovir (ZOVIRAX) 200 mg capsule Take 2 Capsules by mouth daily. Indications: a herpes simplex infection 180 Capsule 3    silver sulfADIAZINE (SILVADENE) 1 % topical cream Apply  to affected area daily. 25 g 0    aspirin 81 mg chewable tablet Take 81 mg by mouth as needed. glucosamine sulfate 500 mg capsule Take 500 mg by mouth three (3) times daily. omega 3-dha-epa-fish oil 100-160-1,000 mg cap Take 1 Tab by mouth daily. vitamin E (AQUA GEMS) 400 unit capsule Take 400 Units by mouth daily. No current facility-administered medications on file prior to visit. No Known Allergies       Objective     Visit Vitals  /70 (BP 1 Location: Left upper arm, BP Patient Position: Sitting, BP Cuff Size: Large adult)   Pulse 72   Temp 97.2 °F (36.2 °C) (Temporal)   Resp 20   Ht 6' (1.829 m)   Wt 216 lb (98 kg)   SpO2 98%   BMI 29.29 kg/m²     Physical Exam  Vitals and nursing note reviewed. Constitutional:       General: He is not in acute distress. Appearance: Normal appearance. HENT:      Head: Normocephalic and atraumatic. Cardiovascular:      Rate and Rhythm: Normal rate and regular rhythm. Heart sounds: No murmur heard. Pulmonary:      Effort: Pulmonary effort is normal. No respiratory distress. Musculoskeletal:         General: Normal range of motion. Right lower leg: No edema. Left lower leg: No edema. Skin:     General: Skin is warm. Neurological:      General: No focal deficit present. Mental Status: He is alert and oriented to person, place, and time.           Assessment and Plan     Diagnoses and all orders for this visit:    1. Medicare annual wellness visit, subsequent  Comments:  Age appropriate guidance given, HM updated. Orders:  -     CBC W/O DIFF  -     METABOLIC PANEL, COMPREHENSIVE  -     LIPID PANEL  -     HEMOGLOBIN A1C WITH EAG  -     pneumococcal 23-valent (PNEUMOVAX 23) 25 mcg/0.5 mL injection; 0.5 mL by IntraMUSCular route once for 1 dose. 2. Type 2 diabetes mellitus without complication, without long-term current use of insulin ([address]  Comments:  Checking labs today. Orders:  -     CBC W/O DIFF  -     METABOLIC PANEL, COMPREHENSIVE  -     HEMOGLOBIN A1C WITH EAG    3. Essential (primary) hypertension  Comments: At goal, continue current regimen. CMP check today. Orders:  -     CBC W/O DIFF  -     METABOLIC PANEL, COMPREHENSIVE    4. Mixed hyperlipidemia  Comments:  Continue statin nightly. Orders:  -     METABOLIC PANEL, COMPREHENSIVE  -     LIPID PANEL    5. Encounter for immunization  -     pneumococcal 23-valent (PNEUMOVAX 23) 25 mcg/0.5 mL injection; 0.5 mL by IntraMUSCular route once for 1 dose. Medication Side Effects and Warnings were discussed with patient, as indicated. Patient Labs were reviewed and or requested, as indicated. Patient Past Records were reviewed and or requested, as indicated. Follow-up and Dispositions    Return in about 6 months (around 6/28/2023) for DM/HTN/HLD.             Baron Gisela MD  [address] [place] [address]

## 2022-12-28 NOTE — PROGRESS NOTES
Chuck  1 [facility], [address] [place] [address]  [phone]    Date of visit: 12/28/2022       This is a Subsequent Medicare Annual Wellness Visit (AWV), (Performed more than 12 months after effective date of Medicare Part B enrollment and 12 months after last preventive visit.)    I have reviewed the patient's medical history in detail and updated the computerized patient record. History obtained from: the patient. male  74 y.o. WHITE/NON-  Depression Risk Factor Screening:     3 most recent PHQ Screens 12/28/2022   Little interest or pleasure in doing things Not at all   Feeling down, depressed, irritable, or hopeless Not at all   Total Score PHQ 2 0          Fall Risk Factor Screening:     Fall Risk Assessment, last 12 mths 12/28/2022   Able to walk? Yes   Fall in past 12 months? 0   Do you feel unsteady? 0   Are you worried about falling 0       Alcohol Risk Screen    Do you average more than 1 drink per night or more than 7 drinks a week: No    In the past three months have you have had more than 4 drinks containing alcohol on one occasion: No         Functional Ability and Level of Safety:    Hearing: Hearing is good. Activities of Daily Living: The home contains: no safety equipment. Patient does total self care      Ambulation: with no difficulty      Abuse Screen:  Patient is not abused         Histories     Reviewed PmHx, FmHx, SocHx as well as meds and allergies, updated and dated in the chart.     Patient Active Problem List   Diagnosis Code    Hyperlipidemia E78.5    Type 2 diabetes mellitus without complication ([address] D07.3    Essential (primary) hypertension I10    Ingrown toenail of right foot L60.0     Past Medical History:   Diagnosis Date    Allergies     Anxiety     Arthritis     Spine     Diabetes ([address]     GERD (gastroesophageal reflux disease)     Herpes     Hypertension     Prostate CA [facility])       Past Surgical History:   Procedure Laterality Date    HX APPENDECTOMY      HX CERVICAL FUSION      HX COLONOSCOPY      HX OTHER SURGICAL  2019    Shoulder surgery     HX OTHER SURGICAL  2019    neck surgery     No Known Allergies  Current Outpatient Medications   Medication Sig Dispense Refill    pneumococcal 23-valent (PNEUMOVAX 23) 25 mcg/0.5 mL injection 0.5 mL by IntraMUSCular route once for 1 dose. 0.5 mL 0    simvastatin (ZOCOR) 40 mg tablet TAKE 1 TABLET BY MOUTH DAILY 90 Tablet 1    amLODIPine (NORVASC) 10 mg tablet TAKE 1 TABLET BY MOUTH DAILY 90 Tablet 1    losartan-hydroCHLOROthiazide (HYZAAR) 100-12.5 mg per tablet TAKE 1 TABLET BY MOUTH DAILY 90 Tablet 1    metFORMIN (GLUCOPHAGE) 500 mg tablet TAKE 1 TABLET BY MOUTH DAILY 90 Tablet 1    acyclovir (ZOVIRAX) 200 mg capsule Take 2 Capsules by mouth daily. Indications: a herpes simplex infection 180 Capsule 3    silver sulfADIAZINE (SILVADENE) 1 % topical cream Apply  to affected area daily. 25 g 0    aspirin 81 mg chewable tablet Take 81 mg by mouth as needed. glucosamine sulfate 500 mg capsule Take 500 mg by mouth three (3) times daily. omega 3-dha-epa-fish oil 100-160-1,000 mg cap Take 1 Tab by mouth daily. vitamin E (AQUA GEMS) 400 unit capsule Take 400 Units by mouth daily. Family History   Problem Relation Age of Onset    Diabetes Mother     Alzheimer's Disease Father     Coronary Art Dis Other     Other Other      Social History     Tobacco Use    Smoking status: Never    Smokeless tobacco: Never   Substance Use Topics    Alcohol use: Yes       Current Complaints and Pertinent Exam   If patient is due for chronic medical conditions and/or has acute concerns in addition to the medicare wellness visit, they have been informed there may be a copay for the additional services provided, and agree to do both. ROS & Physical Exam: see acute note as applicable. Diet and Exercise: patient is active but does not do dedicated exercise.  Says he eats a varied diet full of vegetables. BP Readings from Last 3 Encounters:   12/28/22 116/70   05/25/22 129/70   11/24/21 138/78      Wt Readings from Last 3 Encounters:   12/28/22 216 lb (98 kg)   05/25/22 219 lb (99.3 kg)   11/24/21 221 lb (100.2 kg)     Body mass index is 29.29 kg/m². No results found. Was the patient's timed Up & Go test unsteady or longer than 30 seconds? no    Evaluation of Cognitive Function   Mood/affect:  happy  Orientation: Person, Place, Time, and Situation  Appearance: age appropriate  Family member/caregiver input: n/a    Specialists/Care Team   Josh Garcia. Uli Cutler has established care with the following healthcare providers:  Patient Care Team:  Usha Beckett MD as PCP - General (Family Medicine)  Usha Beckett MD as PCP - [facility] Empaneled Provider  Angelita Trimble MD as Physician (Dermatology Physician)  Mikey Porter MD as Physician (Urology)     [facility] Maintenance Topics with due status: Overdue       Topic Date Due    Pneumococcal 65+ years 03/22/2018    COVID-19 Vaccine 06/07/2022     Health Maintenance Topics with due status: Not Due       Topic Last Completion Date    DTaP/Tdap/Td series 12/16/2015    Colorectal Cancer Screening Combo 06/15/2017    Eye Exam Retinal or Dilated 12/03/2021    Diabetic Alb to Cr ratio (uACR) test 05/25/2022    GFR test (Diabetes, CKD 3-4, OR last GFR 15-59) 05/25/2022    Depression Screen 05/25/2022    Foot Exam Q1 05/25/2022    A1C test (Diabetic or Prediabetic) 05/25/2022    Lipid Screen 05/25/2022    Medicare Yearly Exam 12/28/2022     Health Maintenance Topics with due status: Completed       Topic Last Completion Date    Shingles Vaccine 12/19/2020    Hepatitis C Screening 03/15/2021    Flu Vaccine 09/16/2022         Colon cancer:  Recommendation: Colonoscopy every 10y or annual FIT test from 50-75 or every 3 year stool DNA based test with consideration of ongoing screening from 76-85.  and Up to date or Completed    Lung cancer (LDCT): Recommendation: Yearly LDCT for pts 55-77 w 30-pack year hx and currently smoke or quit <15 yr ago. Hepatitis C:  Recommendation: One time screening for all patient's aged 18-79. Diabetes:   Recommendation: USPSTF recommends screening ages 38-67 y/o if overweight or obese. Medicare covers screening in those patients who are overweight, obese, have HTN or dyslipiemia, a personal history of gestational diabetes or prior elevated blood sugar, a family hx of DM, or any patient over age 72    Lipids:   Recommendation: screening for hyperlipidemia every 5 years after age 39      PREVENTIVE CARE - MALE SCREENINGS     Prostate cancer: Recommendation: Consider PSA screening every 2-4 yr from age 53-78 after review of pros and cons. Medicare covers annual PSA screening. AAA:   Recommendation: One-time screening if family history of AAA or smoking history of at least 100 cigarettes lifetime      IMMUNIZATIONS     Immunization History   Administered Date(s) Administered     Influenza, FLUZONE (age 72 y+), High Dose 08/28/2020    COVID-19, PFIZER PURPLE top, DILUTE for use, (age 15 y+), IM, 30mcg/0.3mL 03/18/2021, 04/14/2021, 10/27/2021, 04/12/2022    Influenza High Dose Vaccine PF 09/01/2021    Influenza Vaccine 10/01/2018, 08/30/2019, 09/16/2022    Pneumococcal Conjugate (PCV-13) 03/22/2017    Pneumococcal Polysaccharide (PPSV-23) 11/18/2013, 03/22/2017    Tdap 12/16/2015    Zoster Recombinant 10/20/2020, 12/19/2020       Pneumovax:   Recommendation: PPSV23 once for all >65 and high risk <65     Prevnar:   Recommendation: PCV13 only if >65 and immunocompromised or residing in a nursing home, or in areas of low childhood Pneumococcal vaccination and Not Indicated    Influenza:   Recommendation: Vaccination annually, high dose if 65 or older    Shingrix:  Recommendation: Vaccination 2 shots 2-6 months apart for all age >47    TDaP:    Recommendation: Vaccination Booster with TDaP every 10 yr.     Discussion of Advance Directive   Discussed with Sixto Blair. Lloyd Ko his ability to prepare and advance directive in the case that an injury or illness causes him to be unable to make health care decisions. Date of ACP Conversation: 12/28/22  Persons included in Conversation:  patient  Length of ACP Conversation in minutes:  <16 minutes (Non-Billable)    Authorized Decision Maker (if patient is incapable of making informed decisions): This person is:   Named in Advance Directive or Healthcare Power of       Primary Decision Maker: Inna Shi [facility] - [phone]        For Patients with Decision Making Capacity:   Values/Goals: Exploration of values, goals, and preferences if recovery is not expected, even with continued medical treatment in the event of:  Imminent death  Severe, permanent brain injury  \"In these circumstances, what matters most to you? \"  Care focused more on comfort or quality of life. Conversation Outcomes / Follow-Up Plan:   ACP in process - information provided, considering goals and options    Assessment/Plan       Diagnoses and all orders for this visit:    1. Medicare annual wellness visit, subsequent  Comments:  Age appropriate guidance given, HM updated. Orders:  -     CBC W/O DIFF  -     METABOLIC PANEL, COMPREHENSIVE  -     LIPID PANEL  -     HEMOGLOBIN A1C WITH EAG  -     pneumococcal 23-valent (PNEUMOVAX 23) 25 mcg/0.5 mL injection; 0.5 mL by IntraMUSCular route once for 1 dose. 2. Type 2 diabetes mellitus without complication, without long-term current use of insulin ([facility] [address]  Comments:  Checking labs today. Orders:  -     CBC W/O DIFF  -     METABOLIC PANEL, COMPREHENSIVE  -     HEMOGLOBIN A1C WITH EAG    3. Essential (primary) hypertension  Comments: At goal, continue current regimen. CMP check today. Orders:  -     CBC W/O DIFF  -     METABOLIC PANEL, COMPREHENSIVE    4. Mixed hyperlipidemia  Comments:  Continue statin nightly.    Orders:  -     METABOLIC PANEL, COMPREHENSIVE  -     LIPID PANEL    5. Encounter for immunization  -     pneumococcal 23-valent (PNEUMOVAX 23) 25 mcg/0.5 mL injection; 0.5 mL by IntraMUSCular route once for 1 dose. Follow-up and Dispositions    Return in about 6 months (around 6/28/2023) for DM/HTN/HLD.          Darling Ruiz MD  [address] [place] [address]

## 2022-12-29 DIAGNOSIS — D64.9 ANEMIA, UNSPECIFIED TYPE: Primary | ICD-10-CM

## 2022-12-29 DIAGNOSIS — E78.2 MIXED HYPERLIPIDEMIA: Chronic | ICD-10-CM

## 2022-12-29 LAB
ALBUMIN SERPL-MCNC: 4.4 G/DL (ref 3.7–4.7)
ALBUMIN/GLOB SERPL: 1.7 {RATIO} (ref 1.2–2.2)
ALP SERPL-CCNC: 87 IU/L (ref 44–121)
ALT SERPL-CCNC: 11 IU/L (ref 0–44)
AST SERPL-CCNC: 15 IU/L (ref 0–40)
BILIRUB SERPL-MCNC: 0.3 MG/DL (ref 0–1.2)
BUN SERPL-MCNC: 19 MG/DL (ref 8–27)
BUN/CREAT SERPL: 16 (ref 10–24)
CALCIUM SERPL-MCNC: 9.3 MG/DL (ref 8.6–10.2)
CHLORIDE SERPL-SCNC: 102 MMOL/L (ref 96–106)
CHOLEST SERPL-MCNC: 155 MG/DL (ref 100–199)
CO2 SERPL-SCNC: 23 MMOL/L (ref 20–29)
CREAT SERPL-MCNC: 1.16 MG/DL (ref 0.76–1.27)
EGFR: 66 ML/MIN/1.73
ERYTHROCYTE [DISTWIDTH] IN BLOOD BY AUTOMATED COUNT: 15.7 % (ref 11.6–15.4)
EST. AVERAGE GLUCOSE BLD GHB EST-MCNC: 128 MG/DL
GLOBULIN SER CALC-MCNC: 2.6 G/DL (ref 1.5–4.5)
GLUCOSE SERPL-MCNC: 97 MG/DL (ref 70–99)
HBA1C MFR BLD: 6.1 % (ref 4.8–5.6)
HCT VFR BLD AUTO: 37.4 % (ref 37.5–51)
HDLC SERPL-MCNC: 43 MG/DL
HGB BLD-MCNC: 12 G/DL (ref 13–17.7)
LDLC SERPL CALC-MCNC: 79 MG/DL (ref 0–99)
MCH RBC QN AUTO: 25.7 PG (ref 26.6–33)
MCHC RBC AUTO-ENTMCNC: 32.1 G/DL (ref 31.5–35.7)
MCV RBC AUTO: 80 FL (ref 79–97)
PLATELET # BLD AUTO: 453 X10E3/UL (ref 150–450)
POTASSIUM SERPL-SCNC: 4.6 MMOL/L (ref 3.5–5.2)
PROT SERPL-MCNC: 7 G/DL (ref 6–8.5)
RBC # BLD AUTO: 4.67 X10E6/UL (ref 4.14–5.8)
SODIUM SERPL-SCNC: 141 MMOL/L (ref 134–144)
TRIGL SERPL-MCNC: 194 MG/DL (ref 0–149)
VLDLC SERPL CALC-MCNC: 33 MG/DL (ref 5–40)
WBC # BLD AUTO: 8.4 X10E3/UL (ref 3.4–10.8)

## 2022-12-29 RX ORDER — SIMVASTATIN 80 MG/1
40 TABLET, FILM COATED ORAL
Qty: 90 TABLET | Refills: 1 | Status: SHIPPED | OUTPATIENT
Start: 2022-12-29

## 2022-12-29 NOTE — PROGRESS NOTES
Please call patient. Anemic-he said his last colonoscopy was this year correct? Need to get report from Dr. Laurent Xiao (GAGE completed yesterday). I'm adding a few extra studies to evaluate cause of his anemia--he needs to make lab appt for the additional labs. Kidney and liver function WNL. His risk of heart attack or stroke within the next 10 years is elevated at almost 40%. I am increasing his simvastatin from 40 mg to 80 mg. Be sure to take the cholesterol medication at night and not during the day. Diabetes is stable. Continue metformin daily.

## 2023-01-04 NOTE — PROGRESS NOTES
Called and spoke with patient, Paperwork from Dr. Sarah Florence office in media. Coming in today for follow up bw.

## 2023-01-05 DIAGNOSIS — E78.2 MIXED HYPERLIPIDEMIA: Chronic | ICD-10-CM

## 2023-01-05 RX ORDER — ATORVASTATIN CALCIUM 80 MG/1
80 TABLET, FILM COATED ORAL
Qty: 90 TABLET | Refills: 1 | Status: SHIPPED | OUTPATIENT
Start: 2023-01-05

## 2023-01-05 NOTE — PROGRESS NOTES
Called patient. Iron very low and anemia. Recent colonoscopy had poor prep but no follow up recommended due to age. Recent PSA with Urology WNL. Patient to take iron with vitamin c daily. Switching patient to atorvastatin 80mg nightly from simvastatin.

## 2023-05-04 DIAGNOSIS — I10 ESSENTIAL (PRIMARY) HYPERTENSION: ICD-10-CM

## 2023-05-04 DIAGNOSIS — E11.9 TYPE 2 DIABETES MELLITUS WITHOUT COMPLICATION, WITHOUT LONG-TERM CURRENT USE OF INSULIN (HCC): Chronic | ICD-10-CM

## 2023-05-04 DIAGNOSIS — E78.2 MIXED HYPERLIPIDEMIA: Chronic | ICD-10-CM

## 2023-05-04 RX ORDER — SIMVASTATIN 40 MG/1
40 TABLET, FILM COATED ORAL DAILY
Qty: 90 TABLET | Refills: 1 | Status: SHIPPED | OUTPATIENT
Start: 2023-05-04

## 2023-05-04 RX ORDER — AMLODIPINE BESYLATE 10 MG/1
10 TABLET ORAL DAILY
Qty: 90 TABLET | Refills: 1 | Status: SHIPPED | OUTPATIENT
Start: 2023-05-04

## 2023-05-04 RX ORDER — METFORMIN HYDROCHLORIDE 500 MG/1
500 TABLET ORAL DAILY
Qty: 90 TABLET | Refills: 1 | Status: SHIPPED | OUTPATIENT
Start: 2023-05-04

## 2023-05-04 RX ORDER — LOSARTAN POTASSIUM AND HYDROCHLOROTHIAZIDE 12.5; 1 MG/1; MG/1
1 TABLET ORAL DAILY
Qty: 90 TABLET | Refills: 1 | Status: SHIPPED | OUTPATIENT
Start: 2023-05-04

## 2023-05-25 RX ORDER — ACYCLOVIR 200 MG/1
400 CAPSULE ORAL DAILY
COMMUNITY
Start: 2022-05-25

## 2023-05-25 RX ORDER — GLUCOSAMINE SULFATE 500 MG
500 CAPSULE ORAL 3 TIMES DAILY
COMMUNITY

## 2023-05-25 RX ORDER — ASPIRIN 81 MG/1
81 TABLET, CHEWABLE ORAL PRN
COMMUNITY

## 2023-05-25 RX ORDER — LOSARTAN POTASSIUM AND HYDROCHLOROTHIAZIDE 12.5; 1 MG/1; MG/1
1 TABLET ORAL DAILY
COMMUNITY
Start: 2023-05-04

## 2023-05-25 RX ORDER — ATORVASTATIN CALCIUM 80 MG/1
1 TABLET, FILM COATED ORAL NIGHTLY
COMMUNITY
Start: 2023-01-05 | End: 2023-07-07 | Stop reason: SDUPTHER

## 2023-05-25 RX ORDER — SIMVASTATIN 40 MG
40 TABLET ORAL DAILY
COMMUNITY
Start: 2023-05-04

## 2023-05-25 RX ORDER — AMLODIPINE BESYLATE 10 MG/1
10 TABLET ORAL DAILY
COMMUNITY
Start: 2023-05-04

## 2023-07-07 ENCOUNTER — TELEPHONE (OUTPATIENT)
Dept: PRIMARY CARE CLINIC | Facility: CLINIC | Age: 73
End: 2023-07-07

## 2023-07-07 RX ORDER — ATORVASTATIN CALCIUM 80 MG/1
80 TABLET, FILM COATED ORAL NIGHTLY
Qty: 90 TABLET | Refills: 1 | Status: SHIPPED | OUTPATIENT
Start: 2023-07-07

## 2023-08-18 ENCOUNTER — TELEPHONE (OUTPATIENT)
Dept: PRIMARY CARE CLINIC | Facility: CLINIC | Age: 73
End: 2023-08-18

## 2023-08-18 NOTE — TELEPHONE ENCOUNTER
Pt needs rx refill on Acyclovir 200mg Takes 2 Caps by mouth daily for herpes simplex   [facility] [address]

## 2023-08-21 DIAGNOSIS — A60.00 HERPESVIRAL INFECTION OF UROGENITAL SYSTEM, UNSPECIFIED: Primary | ICD-10-CM

## 2023-08-21 RX ORDER — ACYCLOVIR 200 MG/1
400 CAPSULE ORAL DAILY
Qty: 60 CAPSULE | Refills: 2 | Status: SHIPPED | OUTPATIENT
Start: 2023-08-21

## 2023-10-05 ENCOUNTER — OFFICE VISIT (OUTPATIENT)
Dept: PRIMARY CARE CLINIC | Facility: CLINIC | Age: 73
End: 2023-10-05
Payer: MEDICARE

## 2023-10-05 VITALS
OXYGEN SATURATION: 95 % | DIASTOLIC BLOOD PRESSURE: 64 MMHG | HEART RATE: 66 BPM | WEIGHT: 218 LBS | SYSTOLIC BLOOD PRESSURE: 123 MMHG | RESPIRATION RATE: 16 BRPM | HEIGHT: 72 IN | TEMPERATURE: 97.8 F | BODY MASS INDEX: 29.53 KG/M2

## 2023-10-05 DIAGNOSIS — E78.2 MIXED HYPERLIPIDEMIA: ICD-10-CM

## 2023-10-05 DIAGNOSIS — E11.9 TYPE 2 DIABETES MELLITUS WITHOUT COMPLICATION, WITHOUT LONG-TERM CURRENT USE OF INSULIN (HCC): Primary | ICD-10-CM

## 2023-10-05 DIAGNOSIS — I10 ESSENTIAL (PRIMARY) HYPERTENSION: ICD-10-CM

## 2023-10-05 DIAGNOSIS — A60.00 HERPESVIRAL INFECTION OF UROGENITAL SYSTEM, UNSPECIFIED: ICD-10-CM

## 2023-10-05 PROCEDURE — 3078F DIAST BP <80 MM HG: CPT | Performed by: NURSE PRACTITIONER

## 2023-10-05 PROCEDURE — 99214 OFFICE O/P EST MOD 30 MIN: CPT | Performed by: NURSE PRACTITIONER

## 2023-10-05 PROCEDURE — 3074F SYST BP LT 130 MM HG: CPT | Performed by: NURSE PRACTITIONER

## 2023-10-05 PROCEDURE — 1123F ACP DISCUSS/DSCN MKR DOCD: CPT | Performed by: NURSE PRACTITIONER

## 2023-10-05 RX ORDER — AMLODIPINE BESYLATE 10 MG/1
10 TABLET ORAL DAILY
Qty: 90 TABLET | Refills: 1 | Status: SHIPPED | OUTPATIENT
Start: 2023-10-05

## 2023-10-05 RX ORDER — LOSARTAN POTASSIUM AND HYDROCHLOROTHIAZIDE 12.5; 1 MG/1; MG/1
1 TABLET ORAL DAILY
Qty: 90 TABLET | Refills: 1 | Status: SHIPPED | OUTPATIENT
Start: 2023-10-05

## 2023-10-05 RX ORDER — ACYCLOVIR 200 MG/1
400 CAPSULE ORAL DAILY
Qty: 180 CAPSULE | Refills: 1 | Status: SHIPPED | OUTPATIENT
Start: 2023-10-05

## 2023-10-05 RX ORDER — ATORVASTATIN CALCIUM 80 MG/1
80 TABLET, FILM COATED ORAL NIGHTLY
Qty: 90 TABLET | Refills: 1 | Status: SHIPPED | OUTPATIENT
Start: 2023-10-05

## 2023-10-05 ASSESSMENT — PATIENT HEALTH QUESTIONNAIRE - PHQ9
SUM OF ALL RESPONSES TO PHQ QUESTIONS 1-9: 0
2. FEELING DOWN, DEPRESSED OR HOPELESS: 0
SUM OF ALL RESPONSES TO PHQ QUESTIONS 1-9: 0
SUM OF ALL RESPONSES TO PHQ9 QUESTIONS 1 & 2: 0
1. LITTLE INTEREST OR PLEASURE IN DOING THINGS: 0
SUM OF ALL RESPONSES TO PHQ QUESTIONS 1-9: 0
SUM OF ALL RESPONSES TO PHQ QUESTIONS 1-9: 0

## 2023-10-05 ASSESSMENT — ENCOUNTER SYMPTOMS
RESPIRATORY NEGATIVE: 1
GASTROINTESTINAL NEGATIVE: 1

## 2023-10-06 LAB
ALBUMIN SERPL-MCNC: 4.5 G/DL (ref 3.8–4.8)
ALBUMIN/GLOB SERPL: 2.1 {RATIO} (ref 1.2–2.2)
ALP SERPL-CCNC: 93 IU/L (ref 44–121)
ALT SERPL-CCNC: 22 IU/L (ref 0–44)
AST SERPL-CCNC: 23 IU/L (ref 0–40)
BILIRUB SERPL-MCNC: 0.5 MG/DL (ref 0–1.2)
BUN SERPL-MCNC: 19 MG/DL (ref 8–27)
BUN/CREAT SERPL: 20 (ref 10–24)
CALCIUM SERPL-MCNC: 9.1 MG/DL (ref 8.6–10.2)
CHLORIDE SERPL-SCNC: 101 MMOL/L (ref 96–106)
CHOLEST SERPL-MCNC: 141 MG/DL (ref 100–199)
CO2 SERPL-SCNC: 23 MMOL/L (ref 20–29)
CREAT SERPL-MCNC: 0.95 MG/DL (ref 0.76–1.27)
EGFRCR SERPLBLD CKD-EPI 2021: 83 ML/MIN/1.73
ERYTHROCYTE [DISTWIDTH] IN BLOOD BY AUTOMATED COUNT: 12.8 % (ref 11.6–15.4)
GLOBULIN SER CALC-MCNC: 2.1 G/DL (ref 1.5–4.5)
GLUCOSE SERPL-MCNC: 107 MG/DL (ref 70–99)
HBA1C MFR BLD: 6.2 % (ref 4.8–5.6)
HCT VFR BLD AUTO: 42.3 % (ref 37.5–51)
HDLC SERPL-MCNC: 48 MG/DL
HGB BLD-MCNC: 14.3 G/DL (ref 13–17.7)
LDLC SERPL CALC-MCNC: 69 MG/DL (ref 0–99)
MCH RBC QN AUTO: 31.3 PG (ref 26.6–33)
MCHC RBC AUTO-ENTMCNC: 33.8 G/DL (ref 31.5–35.7)
MCV RBC AUTO: 93 FL (ref 79–97)
PLATELET # BLD AUTO: 322 X10E3/UL (ref 150–450)
POTASSIUM SERPL-SCNC: 4.4 MMOL/L (ref 3.5–5.2)
PROT SERPL-MCNC: 6.6 G/DL (ref 6–8.5)
RBC # BLD AUTO: 4.57 X10E6/UL (ref 4.14–5.8)
SODIUM SERPL-SCNC: 139 MMOL/L (ref 134–144)
TRIGL SERPL-MCNC: 136 MG/DL (ref 0–149)
VLDLC SERPL CALC-MCNC: 24 MG/DL (ref 5–40)
WBC # BLD AUTO: 7.4 X10E3/UL (ref 3.4–10.8)

## 2024-02-28 ENCOUNTER — APPOINTMENT (OUTPATIENT)
Facility: HOSPITAL | Age: 74
End: 2024-02-28
Payer: MEDICARE

## 2024-02-28 ENCOUNTER — HOSPITAL ENCOUNTER (EMERGENCY)
Facility: HOSPITAL | Age: 74
Discharge: HOME | End: 2024-02-28
Attending: EMERGENCY MEDICINE
Payer: MEDICARE

## 2024-02-28 VITALS
HEART RATE: 89 BPM | SYSTOLIC BLOOD PRESSURE: 126 MMHG | BODY MASS INDEX: 29.8 KG/M2 | TEMPERATURE: 97.4 F | DIASTOLIC BLOOD PRESSURE: 80 MMHG | WEIGHT: 220 LBS | HEIGHT: 72 IN | RESPIRATION RATE: 16 BRPM | OXYGEN SATURATION: 98 %

## 2024-02-28 DIAGNOSIS — F41.0 PANIC ATTACK: Primary | ICD-10-CM

## 2024-02-28 LAB
ALBUMIN SERPL-MCNC: 4.4 G/DL (ref 3.5–5.2)
ALBUMIN/GLOB SERPL: 1.5 (ref 1.1–2.2)
ALP SERPL-CCNC: 92 U/L (ref 40–129)
ALT SERPL-CCNC: 27 U/L (ref 10–50)
ANION GAP SERPL CALC-SCNC: 11 MMOL/L (ref 5–15)
AST SERPL-CCNC: 25 U/L (ref 10–50)
BASOPHILS # BLD: 0.1 K/UL (ref 0–1)
BASOPHILS NFR BLD: 0 % (ref 0–1)
BILIRUB SERPL-MCNC: 0.4 MG/DL (ref 0.2–1)
BUN SERPL-MCNC: 22 MG/DL (ref 8–23)
BUN/CREAT SERPL: 20 (ref 12–20)
CALCIUM SERPL-MCNC: 9 MG/DL (ref 8.8–10.2)
CHLORIDE SERPL-SCNC: 103 MMOL/L (ref 98–107)
CO2 SERPL-SCNC: 25 MMOL/L (ref 22–29)
CREAT SERPL-MCNC: 1.1 MG/DL (ref 0.7–1.2)
DIFFERENTIAL METHOD BLD: ABNORMAL
EKG ATRIAL RATE: 66 BPM
EKG DIAGNOSIS: NORMAL
EKG P AXIS: 56 DEGREES
EKG P-R INTERVAL: 140 MS
EKG Q-T INTERVAL: 394 MS
EKG QRS DURATION: 100 MS
EKG QTC CALCULATION (BAZETT): 413 MS
EKG R AXIS: 38 DEGREES
EKG T AXIS: 50 DEGREES
EKG VENTRICULAR RATE: 66 BPM
EOSINOPHIL # BLD: 0.2 K/UL (ref 0–0.4)
EOSINOPHIL NFR BLD: 2 % (ref 0–7)
ERYTHROCYTE [DISTWIDTH] IN BLOOD BY AUTOMATED COUNT: 13.9 % (ref 11.5–14.5)
GLOBULIN SER CALC-MCNC: 2.9 G/DL (ref 2–4)
GLUCOSE SERPL-MCNC: 112 MG/DL (ref 65–100)
HCT VFR BLD AUTO: 40.7 % (ref 36.6–50.3)
HGB BLD-MCNC: 13.4 G/DL (ref 12.1–17)
IMM GRANULOCYTES # BLD AUTO: 0.1 K/UL (ref 0–0.04)
IMM GRANULOCYTES NFR BLD AUTO: 0 % (ref 0–0.5)
LYMPHOCYTES # BLD: 1.4 K/UL (ref 0.8–3.5)
LYMPHOCYTES NFR BLD: 10 % (ref 12–49)
MCH RBC QN AUTO: 29.6 PG (ref 26–34)
MCHC RBC AUTO-ENTMCNC: 32.9 G/DL (ref 30–36.5)
MCV RBC AUTO: 89.8 FL (ref 80–99)
MONOCYTES # BLD: 0.9 K/UL (ref 0–1)
MONOCYTES NFR BLD: 7 % (ref 5–13)
NEUTS SEG # BLD: 11 K/UL (ref 1.8–8)
NEUTS SEG NFR BLD: 81 % (ref 32–75)
NRBC # BLD: 0 K/UL (ref 0–0.01)
NRBC BLD-RTO: 0 PER 100 WBC
PLATELET # BLD AUTO: 337 K/UL (ref 150–400)
PMV BLD AUTO: 9.1 FL (ref 8.9–12.9)
POTASSIUM SERPL-SCNC: 4.2 MMOL/L (ref 3.5–5.1)
PROT SERPL-MCNC: 7.3 G/DL (ref 6.4–8.3)
RBC # BLD AUTO: 4.53 M/UL (ref 4.1–5.7)
SODIUM SERPL-SCNC: 139 MMOL/L (ref 136–145)
WBC # BLD AUTO: 13.6 K/UL (ref 4.1–11.1)

## 2024-02-28 PROCEDURE — 85025 COMPLETE CBC W/AUTO DIFF WBC: CPT

## 2024-02-28 PROCEDURE — 93005 ELECTROCARDIOGRAM TRACING: CPT | Performed by: STUDENT IN AN ORGANIZED HEALTH CARE EDUCATION/TRAINING PROGRAM

## 2024-02-28 PROCEDURE — 84484 ASSAY OF TROPONIN QUANT: CPT

## 2024-02-28 PROCEDURE — 80053 COMPREHEN METABOLIC PANEL: CPT

## 2024-02-28 PROCEDURE — 71046 X-RAY EXAM CHEST 2 VIEWS: CPT

## 2024-02-28 PROCEDURE — 36415 COLL VENOUS BLD VENIPUNCTURE: CPT

## 2024-02-28 PROCEDURE — 99285 EMERGENCY DEPT VISIT HI MDM: CPT

## 2024-02-28 PROCEDURE — 93010 ELECTROCARDIOGRAM REPORT: CPT | Performed by: STUDENT IN AN ORGANIZED HEALTH CARE EDUCATION/TRAINING PROGRAM

## 2024-02-28 RX ORDER — TIZANIDINE 4 MG/1
4 TABLET ORAL 3 TIMES DAILY PRN
Qty: 30 TABLET | Refills: 0 | Status: SHIPPED | OUTPATIENT
Start: 2024-02-28

## 2024-02-28 RX ORDER — HYDROXYZINE HYDROCHLORIDE 25 MG/1
25 TABLET, FILM COATED ORAL EVERY 8 HOURS PRN
Qty: 30 TABLET | Refills: 0 | Status: SHIPPED | OUTPATIENT
Start: 2024-02-28 | End: 2024-03-09

## 2024-02-28 ASSESSMENT — ENCOUNTER SYMPTOMS
DIARRHEA: 0
VOMITING: 0
COUGH: 0
SHORTNESS OF BREATH: 0
EYE PAIN: 0
SORE THROAT: 0
NAUSEA: 0
ABDOMINAL PAIN: 0

## 2024-02-28 ASSESSMENT — LIFESTYLE VARIABLES
HOW MANY STANDARD DRINKS CONTAINING ALCOHOL DO YOU HAVE ON A TYPICAL DAY: 1 OR 2
HOW OFTEN DO YOU HAVE A DRINK CONTAINING ALCOHOL: 4 OR MORE TIMES A WEEK

## 2024-02-28 ASSESSMENT — PAIN - FUNCTIONAL ASSESSMENT: PAIN_FUNCTIONAL_ASSESSMENT: NONE - DENIES PAIN

## 2024-02-28 NOTE — DISCHARGE INSTRUCTIONS
Your workup was negative today but please continue to keep an eye on your symptoms and return with any changes or worsening.  Take new medication as needed for symptoms but please do not take this medication if you are going to be driving as it can make you drowsy.  Please do not take the 2 new medications together follow-up with your primary care provider as currently scheduled.

## 2024-02-28 NOTE — ED TRIAGE NOTES
Pt reports having anxiety attacks. PT has chronic anxiety. Apx 2 months he feels he can't manage his anxiety. States that these attacks have been leaving him tremulous and weak.    Reports PTSD and increased stress at the moment.

## 2024-02-28 NOTE — ED PROVIDER NOTES
[facility] EMERGENCY DEPT  EMERGENCY DEPARTMENT ENCOUNTER      Pt Name: Misael Calero  MRN: [id number]  Birthdate 1950  Date of evaluation: 2/28/2024  Provider: TIMOTHY HOGAN    CHIEF COMPLAINT       Chief Complaint   Patient presents with    Anxiety         HISTORY OF PRESENT ILLNESS   (Location/Symptom, Timing/Onset, Context/Setting, Quality, Duration, Modifying Factors, Severity)  Note limiting factors.   73-year-old male presents to ED with increased anxiety.  Patient reports that he has a history of chronic anxiety and PTSD which he has dealt with for his entire life.  He reports that he often will have panic attacks but he typically can deal with this via coping mechanisms including breathing exercises and self talk.  However, he reports that he has recently been under a great deal of stress and feels like his anxiety has been worsening and panic attacks have been increasing in frequency.  He reports today he was at a car dealership when he started to feel short of breath and felt like he was having a typical anxiety attack and some of his colleagues recommended that he come in to be evaluated.  He reports he has a follow-up with his primary care provider next week to discuss long-term anxiety medication but is hoping to get some relief while he is here as well.  Denies any associated chest pain, shortness of breath, nausea, vomiting, diarrhea, fevers or chills.  Denies any SI, HI, auditory or visual hallucinations.            Review of External Medical Records:     Nursing Notes were reviewed.    REVIEW OF SYSTEMS    (2-9 systems for level 4, 10 or more for level 5)     Review of Systems   Constitutional:  Negative for chills and fever.   HENT:  Negative for congestion, ear pain and sore throat.    Eyes:  Negative for pain.   Respiratory:  Negative for cough and shortness of breath.    Cardiovascular:  Negative for chest pain.   Gastrointestinal:  Negative for abdominal pain, diarrhea, nausea and

## 2024-03-13 ENCOUNTER — OFFICE VISIT (OUTPATIENT)
Dept: PRIMARY CARE CLINIC | Facility: CLINIC | Age: 74
End: 2024-03-13
Payer: MEDICARE

## 2024-03-13 VITALS
WEIGHT: 220 LBS | HEIGHT: 72 IN | SYSTOLIC BLOOD PRESSURE: 147 MMHG | BODY MASS INDEX: 29.8 KG/M2 | RESPIRATION RATE: 16 BRPM | TEMPERATURE: 97.6 F | DIASTOLIC BLOOD PRESSURE: 76 MMHG | OXYGEN SATURATION: 98 % | HEART RATE: 68 BPM

## 2024-03-13 DIAGNOSIS — M25.50 ARTHRALGIA, UNSPECIFIED JOINT: ICD-10-CM

## 2024-03-13 DIAGNOSIS — R53.83 FATIGUE, UNSPECIFIED TYPE: ICD-10-CM

## 2024-03-13 DIAGNOSIS — Z63.8 STRESS DUE TO FAMILY TENSION: ICD-10-CM

## 2024-03-13 DIAGNOSIS — M79.10 MYALGIA: ICD-10-CM

## 2024-03-13 DIAGNOSIS — F41.8 SITUATIONAL ANXIETY: ICD-10-CM

## 2024-03-13 DIAGNOSIS — E11.9 TYPE 2 DIABETES MELLITUS WITHOUT COMPLICATION, WITHOUT LONG-TERM CURRENT USE OF INSULIN (HCC): Primary | ICD-10-CM

## 2024-03-13 LAB — HBA1C MFR BLD: 5.8 %

## 2024-03-13 PROCEDURE — 83036 HEMOGLOBIN GLYCOSYLATED A1C: CPT | Performed by: PREVENTIVE MEDICINE

## 2024-03-13 PROCEDURE — 3017F COLORECTAL CA SCREEN DOC REV: CPT | Performed by: PREVENTIVE MEDICINE

## 2024-03-13 PROCEDURE — G8419 CALC BMI OUT NRM PARAM NOF/U: HCPCS | Performed by: PREVENTIVE MEDICINE

## 2024-03-13 PROCEDURE — 2022F DILAT RTA XM EVC RTNOPTHY: CPT | Performed by: PREVENTIVE MEDICINE

## 2024-03-13 PROCEDURE — 3078F DIAST BP <80 MM HG: CPT | Performed by: PREVENTIVE MEDICINE

## 2024-03-13 PROCEDURE — G8427 DOCREV CUR MEDS BY ELIG CLIN: HCPCS | Performed by: PREVENTIVE MEDICINE

## 2024-03-13 PROCEDURE — G8484 FLU IMMUNIZE NO ADMIN: HCPCS | Performed by: PREVENTIVE MEDICINE

## 2024-03-13 PROCEDURE — 1036F TOBACCO NON-USER: CPT | Performed by: PREVENTIVE MEDICINE

## 2024-03-13 PROCEDURE — 3046F HEMOGLOBIN A1C LEVEL >9.0%: CPT | Performed by: PREVENTIVE MEDICINE

## 2024-03-13 PROCEDURE — 99215 OFFICE O/P EST HI 40 MIN: CPT | Performed by: PREVENTIVE MEDICINE

## 2024-03-13 PROCEDURE — 1123F ACP DISCUSS/DSCN MKR DOCD: CPT | Performed by: PREVENTIVE MEDICINE

## 2024-03-13 PROCEDURE — 3077F SYST BP >= 140 MM HG: CPT | Performed by: PREVENTIVE MEDICINE

## 2024-03-13 RX ORDER — SERTRALINE HYDROCHLORIDE 25 MG/1
25 TABLET, FILM COATED ORAL DAILY
Qty: 30 TABLET | Refills: 1 | Status: SHIPPED | OUTPATIENT
Start: 2024-03-13 | End: 2024-04-03 | Stop reason: SDUPTHER

## 2024-03-13 RX ORDER — HYDROXYZINE HYDROCHLORIDE 25 MG/1
25 TABLET, FILM COATED ORAL 3 TIMES DAILY PRN
COMMUNITY
End: 2024-04-03

## 2024-03-13 SDOH — ECONOMIC STABILITY: FOOD INSECURITY: WITHIN THE PAST 12 MONTHS, YOU WORRIED THAT YOUR FOOD WOULD RUN OUT BEFORE YOU GOT MONEY TO BUY MORE.: NEVER TRUE

## 2024-03-13 SDOH — SOCIAL STABILITY - SOCIAL INSECURITY: OTHER SPECIFIED PROBLEMS RELATED TO PRIMARY SUPPORT GROUP: Z63.8

## 2024-03-13 SDOH — ECONOMIC STABILITY: INCOME INSECURITY: HOW HARD IS IT FOR YOU TO PAY FOR THE VERY BASICS LIKE FOOD, HOUSING, MEDICAL CARE, AND HEATING?: NOT VERY HARD

## 2024-03-13 SDOH — ECONOMIC STABILITY: FOOD INSECURITY: WITHIN THE PAST 12 MONTHS, THE FOOD YOU BOUGHT JUST DIDN'T LAST AND YOU DIDN'T HAVE MONEY TO GET MORE.: NEVER TRUE

## 2024-03-13 SDOH — ECONOMIC STABILITY: HOUSING INSECURITY
IN THE LAST 12 MONTHS, WAS THERE A TIME WHEN YOU DID NOT HAVE A STEADY PLACE TO SLEEP OR SLEPT IN A SHELTER (INCLUDING NOW)?: NO

## 2024-03-13 ASSESSMENT — PATIENT HEALTH QUESTIONNAIRE - PHQ9
SUM OF ALL RESPONSES TO PHQ9 QUESTIONS 1 & 2: 1
1. LITTLE INTEREST OR PLEASURE IN DOING THINGS: SEVERAL DAYS
2. FEELING DOWN, DEPRESSED OR HOPELESS: NOT AT ALL
SUM OF ALL RESPONSES TO PHQ QUESTIONS 1-9: 1

## 2024-03-13 NOTE — PATIENT INSTRUCTIONS
Nature's Boenrich-in Sublingual Liquid B-complex - 1ml daily for 3 weeks, then weekly. Hold under tongue for a 30 seconds and swallow.     Nature's Bounty Vitamin C 1000mg Capsules. Take 2 capsules per day.     Afrin 12 hour nasal spray - 1 spray each nostril 20 minutes prior to using a Neti Pot twice daily for treatment of allergies and drainage.

## 2024-03-13 NOTE — PROGRESS NOTES
\"Have you been to the ER, urgent care clinic since your last visit?  Hospitalized since your last visit?\"    YES - When: approximately 2  weeks ago.  Where and Why: 02/28/24 Anxiety.    “Have you seen or consulted any other health care providers outside of [facility] since your last visit?”    NO

## 2024-03-13 NOTE — PROGRESS NOTES
Misael Calero is a 73 y.o. male who was seen in clinic today (3/13/2024).    Assessment & Plan:   Below is the assessment and plan developed based on review of pertinent history, physical exam, labs, studies, and medications.    1. Type 2 diabetes mellitus without complication, without long-term current use of insulin (HCC)  -     AMB POC HEMOGLOBIN A1C  -     Microalbumin / Creatinine Urine Ratio  2. Arthralgia, unspecified joint  -     MIKEL COMPREHENSIVE PANEL  -     Uric Acid  -     RHEUMATIOD ARTHRITIS DIAGNOSTIC PANEL  -     Sedimentation Rate  3. Myalgia  -     MIKEL COMPREHENSIVE PANEL  -     RHEUMATIOD ARTHRITIS DIAGNOSTIC PANEL  4. Situational anxiety  -     sertraline (ZOLOFT) 25 MG tablet; Take 1 tablet by mouth daily, Disp-30 tablet, R-1Normal  5. Fatigue, unspecified type  6. Stress due to family tension    On this date 3/13/2024 I have spent 60 minutes reviewing previous notes, test results and face to face with the patient discussing the diagnosis and plan of care as well as documenting on the day of the visit.   Patient Instructions   Nature's Bounty Sublingual Liquid B-complex - 1ml daily for 3 weeks, then weekly. Hold under tongue for a 30 seconds and swallow.     Nature's Bounty Vitamin C 1000mg Capsules. Take 2 capsules per day.     Afrin 12 hour nasal spray - 1 spray each nostril 20 minutes prior to using a Neti Pot twice daily for treatment of allergies and drainage.       No follow-ups on file.    Subjective:   Misael was seen today for Follow-up (ER/ anxiety)  He is here c/o anxiety. He is trying to get his bills down from his children. He tells me one sone is getting out of longterm April 1, 2024. He has to get a restraining order against his daughter. He is caring for her son now. Another son is reliable and a 4th son has gotten him $18K in debt with a car that that he was not paying. He was seen in the ER on 2/28/24. His WBCs were 13.6 he also has an elevated Neutrophil to Lymphocyte Ratio

## 2024-03-14 LAB
CENTROMERE B AB SER-ACNC: <0.2 AI (ref 0–0.9)
CHROMATIN AB SERPL-ACNC: <0.2 AI (ref 0–0.9)
DSDNA AB SER-ACNC: <1 IU/ML (ref 0–9)
ENA JO1 AB SER-ACNC: <0.2 AI (ref 0–0.9)
ENA RNP AB SER-ACNC: 0.2 AI (ref 0–0.9)
ENA SCL70 AB SER-ACNC: <0.2 AI (ref 0–0.9)
ENA SM AB SER-ACNC: <0.2 AI (ref 0–0.9)
ENA SS-A AB SER-ACNC: 0.3 AI (ref 0–0.9)
ENA SS-B AB SER-ACNC: <0.2 AI (ref 0–0.9)
ERYTHROCYTE [SEDIMENTATION RATE] IN BLOOD BY WESTERGREN METHOD: 2 MM/HR (ref 0–30)
Lab: NORMAL
URATE SERPL-MCNC: 6.5 MG/DL (ref 3.8–8.4)

## 2024-03-14 NOTE — RESULT ENCOUNTER NOTE
Your MIKEL panel  shows that you do not have any of these connective tissue diseases:  Lupus, Scleroderma, Sjogren's, Dermatomyositis, Polymyositis, or Mixed Connective Tissue Diseases.    Your uric acid is within normal limits, you do not have gout.      Your SED Rate does not indicate systemic inflammation or infection.    Your Hemoglobin A1c shows what your blood sugars have been like for the past 3 months or so. It is 5.8%. Your are pre-diabetic at this point. Your lifestyle choices become an important determining factor as to whether or not that progresses or reverses. We can discuss this further at your next office visit.

## 2024-03-15 LAB
ALBUMIN/CREAT UR: 5 MG/G CREAT (ref 0–29)
CCP IGA+IGG SERPL IA-ACNC: 5 UNITS (ref 0–19)
CREAT UR-MCNC: 197.7 MG/DL
MICROALBUMIN UR-MCNC: 9 UG/ML
RHEUMATOID FACT SERPL-ACNC: 11.6 IU/ML

## 2024-04-01 ASSESSMENT — ENCOUNTER SYMPTOMS
GASTROINTESTINAL NEGATIVE: 1
RESPIRATORY NEGATIVE: 1

## 2024-04-01 NOTE — RESULT ENCOUNTER NOTE
Your microalbumin/Creatinine ratio shows that you do not have the kidney issues associated with diabetic changes.      You do not have rheumatoid arthritis.

## 2024-04-03 ENCOUNTER — OFFICE VISIT (OUTPATIENT)
Dept: PRIMARY CARE CLINIC | Facility: CLINIC | Age: 74
End: 2024-04-03
Payer: MEDICARE

## 2024-04-03 VITALS
WEIGHT: 222.8 LBS | SYSTOLIC BLOOD PRESSURE: 131 MMHG | HEIGHT: 72 IN | BODY MASS INDEX: 30.18 KG/M2 | OXYGEN SATURATION: 98 % | TEMPERATURE: 97.6 F | RESPIRATION RATE: 16 BRPM | DIASTOLIC BLOOD PRESSURE: 69 MMHG | HEART RATE: 66 BPM

## 2024-04-03 DIAGNOSIS — Z01.818 PRE-OPERATIVE GENERAL PHYSICAL EXAMINATION: Primary | ICD-10-CM

## 2024-04-03 DIAGNOSIS — F41.8 SITUATIONAL ANXIETY: ICD-10-CM

## 2024-04-03 PROCEDURE — G8427 DOCREV CUR MEDS BY ELIG CLIN: HCPCS | Performed by: PREVENTIVE MEDICINE

## 2024-04-03 PROCEDURE — 1123F ACP DISCUSS/DSCN MKR DOCD: CPT | Performed by: PREVENTIVE MEDICINE

## 2024-04-03 PROCEDURE — G8417 CALC BMI ABV UP PARAM F/U: HCPCS | Performed by: PREVENTIVE MEDICINE

## 2024-04-03 PROCEDURE — 1036F TOBACCO NON-USER: CPT | Performed by: PREVENTIVE MEDICINE

## 2024-04-03 PROCEDURE — 3075F SYST BP GE 130 - 139MM HG: CPT | Performed by: PREVENTIVE MEDICINE

## 2024-04-03 PROCEDURE — 3078F DIAST BP <80 MM HG: CPT | Performed by: PREVENTIVE MEDICINE

## 2024-04-03 PROCEDURE — 3017F COLORECTAL CA SCREEN DOC REV: CPT | Performed by: PREVENTIVE MEDICINE

## 2024-04-03 PROCEDURE — 99214 OFFICE O/P EST MOD 30 MIN: CPT | Performed by: PREVENTIVE MEDICINE

## 2024-04-03 RX ORDER — SERTRALINE HYDROCHLORIDE 25 MG/1
25 TABLET, FILM COATED ORAL DAILY
Qty: 90 TABLET | Refills: 1 | Status: SHIPPED | OUTPATIENT
Start: 2024-04-03

## 2024-04-03 ASSESSMENT — ENCOUNTER SYMPTOMS
RESPIRATORY NEGATIVE: 1
GASTROINTESTINAL NEGATIVE: 1
ALLERGIC/IMMUNOLOGIC NEGATIVE: 1

## 2024-04-03 NOTE — PROGRESS NOTES
Misael Calero is a 73 y.o. male who was seen in clinic today (4/3/2024).    Assessment & Plan:   Below is the assessment and plan developed based on review of pertinent history, physical exam, labs, studies, and medications.    1. Pre-operative general physical examination  2. Situational anxiety  -     sertraline (ZOLOFT) 25 MG tablet; Take 1 tablet by mouth daily, Disp-90 tablet, R-1Normal    Filled out pre-op paperwork.   Patient Instructions   Cut back on B-Complex to once weekly. Continue Vit C every day.         Return in about 10 weeks (around 6/12/2024).    Subjective:   Misael was seen today for Follow-up (Blood Pressure/ anxiety)  He says the sertraline has done wonders for his anxiety/panic attacks.  He is taking his Vitamin B-Complex and Vit C. He is calm and a happy fellow now. He is no longer aching. He is feeling much better.       Review of Systems   Constitutional: Negative.    HENT: Negative.     Respiratory: Negative.     Cardiovascular: Negative.    Gastrointestinal: Negative.    Endocrine: Negative.    Genitourinary: Negative.    Musculoskeletal: Negative.    Skin: Negative.    Allergic/Immunologic: Negative.    Neurological: Negative.    Hematological: Negative.    Psychiatric/Behavioral: Negative.            Objective:     Vitals:    04/03/24 0959   BP: 131/69   Site: Left Upper Arm   Position: Sitting   Cuff Size: Medium Adult   Pulse: 66   Resp: 16   Temp: 97.6 °F (36.4 °C)   TempSrc: Oral   SpO2: 98%   Weight: 101.1 kg (222 lb 12.8 oz)   Height: 1.829 m (6')      Body mass index is 30.22 kg/m².     Physical Exam  Constitutional:       General: He is not in acute distress.     Appearance: Normal appearance. He is obese. He is not ill-appearing or toxic-appearing.   HENT:      Head: Normocephalic and atraumatic.      Right Ear: Tympanic membrane, ear canal and external ear normal.      Left Ear: Tympanic membrane, ear canal and external ear normal.      Nose: Nose normal. No congestion or

## 2024-05-04 DIAGNOSIS — I10 ESSENTIAL (PRIMARY) HYPERTENSION: ICD-10-CM

## 2024-05-04 DIAGNOSIS — E11.9 TYPE 2 DIABETES MELLITUS WITHOUT COMPLICATION, WITHOUT LONG-TERM CURRENT USE OF INSULIN (HCC): ICD-10-CM

## 2024-05-06 RX ORDER — AMLODIPINE BESYLATE 10 MG/1
10 TABLET ORAL DAILY
Qty: 90 TABLET | Refills: 1 | Status: SHIPPED | OUTPATIENT
Start: 2024-05-06

## 2024-05-06 RX ORDER — LOSARTAN POTASSIUM AND HYDROCHLOROTHIAZIDE 12.5; 1 MG/1; MG/1
1 TABLET ORAL DAILY
Qty: 90 TABLET | Refills: 1 | Status: SHIPPED | OUTPATIENT
Start: 2024-05-06

## 2024-06-26 DIAGNOSIS — E78.2 MIXED HYPERLIPIDEMIA: ICD-10-CM

## 2024-06-26 RX ORDER — ATORVASTATIN CALCIUM 80 MG/1
80 TABLET, FILM COATED ORAL NIGHTLY
Qty: 90 TABLET | Refills: 1 | Status: SHIPPED | OUTPATIENT
Start: 2024-06-26

## 2024-10-10 DIAGNOSIS — F41.8 SITUATIONAL ANXIETY: ICD-10-CM

## 2024-10-10 RX ORDER — SERTRALINE HYDROCHLORIDE 25 MG/1
25 TABLET, FILM COATED ORAL DAILY
Qty: 90 TABLET | Refills: 1 | Status: CANCELLED | OUTPATIENT
Start: 2024-10-10

## 2024-10-15 PROBLEM — H93.19 SUBJECTIVE TINNITUS: Status: ACTIVE | Noted: 2024-10-15

## 2024-10-15 PROBLEM — H90.3 SENSORINEURAL HEARING LOSS, BILATERAL: Status: ACTIVE | Noted: 2024-10-15

## 2024-10-15 PROBLEM — B00.9 HERPES SIMPLEX TYPE 2 INFECTION: Status: RESOLVED | Noted: 2024-10-15 | Resolved: 2024-10-15

## 2024-10-15 PROBLEM — F41.9 ANXIETY DISORDER: Status: ACTIVE | Noted: 2024-10-15

## 2024-10-15 PROBLEM — R76.11 NONSPECIFIC REACTION TO TUBERCULIN TEST: Status: RESOLVED | Noted: 2024-10-15 | Resolved: 2024-10-15

## 2024-10-15 RX ORDER — METHOCARBAMOL 750 MG/1
750 TABLET, FILM COATED ORAL NIGHTLY PRN
COMMUNITY
Start: 2024-09-26 | End: 2024-10-26

## 2024-10-15 RX ORDER — MELOXICAM 15 MG/1
15 TABLET ORAL DAILY
COMMUNITY
Start: 2024-09-26 | End: 2024-11-25

## 2024-10-15 RX ORDER — TADALAFIL 5 MG/1
5 TABLET ORAL DAILY
COMMUNITY

## 2024-10-15 RX ORDER — VITAMIN E 268 MG
400 CAPSULE ORAL DAILY
COMMUNITY

## 2024-10-16 ENCOUNTER — OFFICE VISIT (OUTPATIENT)
Dept: PRIMARY CARE CLINIC | Facility: CLINIC | Age: 74
End: 2024-10-16
Payer: MEDICARE

## 2024-10-16 VITALS
WEIGHT: 214 LBS | TEMPERATURE: 97.6 F | HEART RATE: 77 BPM | OXYGEN SATURATION: 98 % | HEIGHT: 72 IN | BODY MASS INDEX: 28.99 KG/M2 | RESPIRATION RATE: 16 BRPM | SYSTOLIC BLOOD PRESSURE: 135 MMHG | DIASTOLIC BLOOD PRESSURE: 73 MMHG

## 2024-10-16 DIAGNOSIS — I10 ESSENTIAL (PRIMARY) HYPERTENSION: Primary | ICD-10-CM

## 2024-10-16 DIAGNOSIS — F41.1 GAD (GENERALIZED ANXIETY DISORDER): ICD-10-CM

## 2024-10-16 DIAGNOSIS — Z91.81 AT HIGH RISK FOR FALLS: ICD-10-CM

## 2024-10-16 PROCEDURE — 3075F SYST BP GE 130 - 139MM HG: CPT | Performed by: FAMILY MEDICINE

## 2024-10-16 PROCEDURE — 3078F DIAST BP <80 MM HG: CPT | Performed by: FAMILY MEDICINE

## 2024-10-16 PROCEDURE — G8484 FLU IMMUNIZE NO ADMIN: HCPCS | Performed by: FAMILY MEDICINE

## 2024-10-16 PROCEDURE — G8417 CALC BMI ABV UP PARAM F/U: HCPCS | Performed by: FAMILY MEDICINE

## 2024-10-16 PROCEDURE — G8427 DOCREV CUR MEDS BY ELIG CLIN: HCPCS | Performed by: FAMILY MEDICINE

## 2024-10-16 PROCEDURE — 1123F ACP DISCUSS/DSCN MKR DOCD: CPT | Performed by: FAMILY MEDICINE

## 2024-10-16 PROCEDURE — 99214 OFFICE O/P EST MOD 30 MIN: CPT | Performed by: FAMILY MEDICINE

## 2024-10-16 PROCEDURE — 1036F TOBACCO NON-USER: CPT | Performed by: FAMILY MEDICINE

## 2024-10-16 RX ORDER — VITAMIN B COMPLEX
1 CAPSULE ORAL DAILY
COMMUNITY

## 2024-10-16 RX ORDER — LOSARTAN POTASSIUM AND HYDROCHLOROTHIAZIDE 25; 100 MG/1; MG/1
1 TABLET ORAL DAILY
Qty: 90 TABLET | Refills: 1 | Status: SHIPPED | OUTPATIENT
Start: 2024-10-16

## 2024-10-16 RX ORDER — SERTRALINE HYDROCHLORIDE 25 MG/1
25 TABLET, FILM COATED ORAL DAILY
Qty: 90 TABLET | Refills: 1 | Status: SHIPPED | OUTPATIENT
Start: 2024-10-16

## 2024-10-16 RX ORDER — MULTIVIT WITH MINERALS/LUTEIN
1000 TABLET ORAL DAILY
COMMUNITY

## 2024-10-16 ASSESSMENT — ENCOUNTER SYMPTOMS
ABDOMINAL PAIN: 0
SORE THROAT: 0
SHORTNESS OF BREATH: 0

## 2024-10-16 ASSESSMENT — ANXIETY QUESTIONNAIRES
7. FEELING AFRAID AS IF SOMETHING AWFUL MIGHT HAPPEN: NOT AT ALL
GAD7 TOTAL SCORE: 10
1. FEELING NERVOUS, ANXIOUS, OR ON EDGE: MORE THAN HALF THE DAYS
5. BEING SO RESTLESS THAT IT IS HARD TO SIT STILL: MORE THAN HALF THE DAYS
3. WORRYING TOO MUCH ABOUT DIFFERENT THINGS: MORE THAN HALF THE DAYS
6. BECOMING EASILY ANNOYED OR IRRITABLE: NOT AT ALL
IF YOU CHECKED OFF ANY PROBLEMS ON THIS QUESTIONNAIRE, HOW DIFFICULT HAVE THESE PROBLEMS MADE IT FOR YOU TO DO YOUR WORK, TAKE CARE OF THINGS AT HOME, OR GET ALONG WITH OTHER PEOPLE: NOT DIFFICULT AT ALL
2. NOT BEING ABLE TO STOP OR CONTROL WORRYING: MORE THAN HALF THE DAYS
4. TROUBLE RELAXING: MORE THAN HALF THE DAYS

## 2024-10-16 ASSESSMENT — PATIENT HEALTH QUESTIONNAIRE - PHQ9
1. LITTLE INTEREST OR PLEASURE IN DOING THINGS: NOT AT ALL
SUM OF ALL RESPONSES TO PHQ9 QUESTIONS 1 & 2: 0
2. FEELING DOWN, DEPRESSED OR HOPELESS: NOT AT ALL
SUM OF ALL RESPONSES TO PHQ QUESTIONS 1-9: 0

## 2024-10-16 NOTE — PROGRESS NOTES
PCP: Roxana Mancia, APRN - CNP    CC: Medication Refill      Subjective      Misael Calero is a 74 y.o. male,Established patient, who presents for medication refills.     Anxiety: Patient here for follow up of anxiety. Current symptoms include: none. Patient denies chest pain, dizziness, palpitations, racing thoughts, shortness of breath. Patient is currently on Zoloft to treat anxiety symptoms. Patient complains of the following side effects from the treatment: none. Getting 3-4 hours of sleep at night due to cervical radiculopathy. His daughter just recently got out of prison, and his relationship with her is better. 6 months ago his youngest son got out of prison and is doing well. Denies SI, HI, or audiovisual hallucinations.      HTN: Currently taking amlodipine 10 mg and Hyzaar 100-12.5 mg. 0 missed doses int he past week. Patient tolerating medication with minimal to no side effects. Ambulatory readings are 120s-150s/80s. Denies any headache, chest pain, dizziness, shortness, or breath, or acute changes in vision. Exercise includes gardening or  tasks. Denies tobacco use.     Falls: Last fall 4 months ago, fell twice. Did not fracture any bones. Does have some stairs in his home with handrails. Does have handles to get in and out of his bathtub. Does think that his house is well lit. He has night lights in the house.       Review of Systems   Constitutional:  Negative for chills and fever.   HENT:  Negative for sore throat.    Eyes:  Negative for visual disturbance.   Respiratory:  Negative for shortness of breath.    Cardiovascular:  Negative for chest pain.   Gastrointestinal:  Negative for abdominal pain.   Genitourinary:  Negative for difficulty urinating and penile discharge.   Musculoskeletal:  Negative for arthralgias.   Skin:  Negative for rash.   Neurological:  Negative for dizziness, weakness and light-headedness.   Psychiatric/Behavioral:  Negative for dysphoric mood and suicidal

## 2024-10-16 NOTE — PROGRESS NOTES
Misael Calero is a 74 y.o. male presenting for Medication Refill      /73   Pulse 77   Temp 97.6 °F (36.4 °C) (Oral)   Resp 16   Ht 1.829 m (6')   Wt 97.1 kg (214 lb)   SpO2 98%   BMI 29.02 kg/m²       Current Outpatient Medications   Medication Sig Dispense Refill    b complex vitamins capsule Take 1 capsule by mouth daily      Ascorbic Acid (VITAMIN C) 1000 MG tablet Take 1 tablet by mouth daily      sertraline (ZOLOFT) 25 MG tablet Take 1 tablet by mouth daily 90 tablet 1    losartan-hydroCHLOROthiazide (HYZAAR) 100-25 MG per tablet Take 1 tablet by mouth daily 90 tablet 1    meloxicam (MOBIC) 15 MG tablet Take 1 tablet by mouth daily      methocarbamol (ROBAXIN) 750 MG tablet Take 1 tablet by mouth nightly as needed      tadalafil (CIALIS) 5 MG tablet Take 1 tablet by mouth daily      Vitamin E 268 MG (400 UNIT) CAPS Take 400 Units by mouth daily      atorvastatin (LIPITOR) 80 MG tablet TAKE 1 TABLET BY MOUTH EVERY NIGHT 90 tablet 1    metFORMIN (GLUCOPHAGE) 500 MG tablet TAKE 1 TABLET BY MOUTH DAILY 90 tablet 1    amLODIPine (NORVASC) 10 MG tablet TAKE 1 TABLET BY MOUTH DAILY 90 tablet 1    acyclovir (ZOVIRAX) 200 MG capsule Take 2 capsules by mouth daily 180 capsule 1    aspirin 81 MG chewable tablet Take 1 tablet by mouth as needed      Glucosamine 500 MG CAPS Take 500 mg by mouth 3 times daily       No current facility-administered medications for this visit.        1. \"Have you been to the ER, urgent care clinic since your last visit?  Hospitalized since your last visit?\" 2/28/2024 (1 hours)  [facility] EMERGENCY DEPT for panic attack     2. \"Have you seen or consulted any other health care providers outside of the [facility] since your last visit?\" No     3. For patients aged 45-75: Has the patient had a colonoscopy / FIT/ Cologuard? NA - based on age

## 2024-10-16 NOTE — ASSESSMENT & PLAN NOTE
-Not at goal, goal < 140/90  -Increase Hyzaar to 100 mg-25mg  -Encouraged continued activity, would benefit from weight bearing/resistance exercise  -Follow up in 4 weeks

## 2024-10-17 LAB
CHOLEST SERPL-MCNC: 114 MG/DL (ref 100–199)
HDLC SERPL-MCNC: 37 MG/DL
LDLC SERPL CALC-MCNC: 50 MG/DL (ref 0–99)
TRIGL SERPL-MCNC: 156 MG/DL (ref 0–149)
VLDLC SERPL CALC-MCNC: 27 MG/DL (ref 5–40)

## 2024-10-30 DIAGNOSIS — I10 ESSENTIAL (PRIMARY) HYPERTENSION: ICD-10-CM

## 2024-10-30 DIAGNOSIS — E11.9 TYPE 2 DIABETES MELLITUS WITHOUT COMPLICATION, WITHOUT LONG-TERM CURRENT USE OF INSULIN (HCC): ICD-10-CM

## 2024-10-30 RX ORDER — AMLODIPINE BESYLATE 10 MG/1
10 TABLET ORAL DAILY
Qty: 90 TABLET | Refills: 1 | Status: SHIPPED | OUTPATIENT
Start: 2024-10-30

## 2024-11-15 ENCOUNTER — OFFICE VISIT (OUTPATIENT)
Dept: PRIMARY CARE CLINIC | Facility: CLINIC | Age: 74
End: 2024-11-15

## 2024-11-15 VITALS
SYSTOLIC BLOOD PRESSURE: 124 MMHG | HEART RATE: 77 BPM | OXYGEN SATURATION: 97 % | DIASTOLIC BLOOD PRESSURE: 62 MMHG | BODY MASS INDEX: 28.44 KG/M2 | WEIGHT: 210 LBS | HEIGHT: 72 IN | TEMPERATURE: 98.7 F

## 2024-11-15 DIAGNOSIS — M54.12 CERVICAL RADICULOPATHY: ICD-10-CM

## 2024-11-15 DIAGNOSIS — I10 ESSENTIAL (PRIMARY) HYPERTENSION: Primary | ICD-10-CM

## 2024-11-15 RX ORDER — GABAPENTIN 300 MG/1
300 CAPSULE ORAL NIGHTLY
Qty: 60 CAPSULE | Refills: 0 | Status: SHIPPED | OUTPATIENT
Start: 2024-11-15 | End: 2025-01-14

## 2024-11-15 ASSESSMENT — ENCOUNTER SYMPTOMS
ABDOMINAL PAIN: 0
SHORTNESS OF BREATH: 0
SORE THROAT: 0

## 2024-11-15 NOTE — PROGRESS NOTES
\"Have you been to the ER, urgent care clinic since your last visit?  Hospitalized since your last visit?\"    NO    “Have you seen or consulted any other health care providers outside our system since your last visit?”    NO           
Oral    SpO2: 97%    Weight: 95.3 kg (210 lb)    Height: 1.829 m (6')       Body mass index is 28.48 kg/m².       Physical Exam  Constitutional:       General: He is not in acute distress.  HENT:      Right Ear: External ear normal.      Left Ear: External ear normal.   Eyes:      Extraocular Movements: Extraocular movements intact.      Pupils: Pupils are equal, round, and reactive to light.   Cardiovascular:      Rate and Rhythm: Normal rate and regular rhythm.      Pulses: Normal pulses.   Pulmonary:      Effort: Pulmonary effort is normal.      Breath sounds: Normal breath sounds.   Musculoskeletal:         General: Normal range of motion.      Cervical back: No tenderness.   Lymphadenopathy:      Cervical: No cervical adenopathy.   Skin:     General: Skin is warm and dry.      Capillary Refill: Capillary refill takes less than 2 seconds.   Neurological:      General: No focal deficit present.      Mental Status: He is alert and oriented to person, place, and time.   Psychiatric:         Mood and Affect: Mood normal.           No data recorded        Assessment & Plan      Assessment & Plan  Essential (primary) hypertension  -Well controlled on repeat evaluation. Goal BP < 140/90  - Continue Hyzaar 100-25 mg daily  - Discussed advantages of DASH and Mediterranean diet.   - Follow-up in 2 months after he gets things settled with neurosurgery  Cervical radiculopathy  -Following with ortho and neurosurgery  -Prescribed gabapentin 300 mg qHS for neuropathic pain. Can increase to 600 mg qHS if minimal effect on pain after 1-2 weeks on medication     Return in about 2 weeks (around 11/29/2024) for AWV.      An electronic signature was used to authenticate this note.    --Sabi Shearer, DO

## 2024-11-15 NOTE — ASSESSMENT & PLAN NOTE
-Well controlled on repeat evaluation. Goal BP < 140/90  - Continue Hyzaar 100-25 mg daily  - Discussed advantages of DASH and Mediterranean diet.   - Follow-up in 2 months after he gets things settled with neurosurgery

## 2024-12-02 ENCOUNTER — OFFICE VISIT (OUTPATIENT)
Dept: PRIMARY CARE CLINIC | Facility: CLINIC | Age: 74
End: 2024-12-02
Payer: MEDICARE

## 2024-12-02 VITALS
RESPIRATION RATE: 16 BRPM | HEIGHT: 72 IN | DIASTOLIC BLOOD PRESSURE: 70 MMHG | SYSTOLIC BLOOD PRESSURE: 126 MMHG | WEIGHT: 211 LBS | BODY MASS INDEX: 28.58 KG/M2 | HEART RATE: 71 BPM | TEMPERATURE: 97.9 F

## 2024-12-02 DIAGNOSIS — Z00.00 MEDICARE ANNUAL WELLNESS VISIT, SUBSEQUENT: Primary | ICD-10-CM

## 2024-12-02 DIAGNOSIS — M54.12 CERVICAL RADICULOPATHY: ICD-10-CM

## 2024-12-02 PROCEDURE — G8484 FLU IMMUNIZE NO ADMIN: HCPCS | Performed by: FAMILY MEDICINE

## 2024-12-02 PROCEDURE — 3078F DIAST BP <80 MM HG: CPT | Performed by: FAMILY MEDICINE

## 2024-12-02 PROCEDURE — 1159F MED LIST DOCD IN RCRD: CPT | Performed by: FAMILY MEDICINE

## 2024-12-02 PROCEDURE — G0439 PPPS, SUBSEQ VISIT: HCPCS | Performed by: FAMILY MEDICINE

## 2024-12-02 PROCEDURE — 3074F SYST BP LT 130 MM HG: CPT | Performed by: FAMILY MEDICINE

## 2024-12-02 PROCEDURE — 1123F ACP DISCUSS/DSCN MKR DOCD: CPT | Performed by: FAMILY MEDICINE

## 2024-12-02 RX ORDER — GABAPENTIN 300 MG/1
300 CAPSULE ORAL 2 TIMES DAILY
Qty: 120 CAPSULE | Refills: 0 | Status: SHIPPED
Start: 2024-12-02 | End: 2025-01-31

## 2024-12-02 ASSESSMENT — PATIENT HEALTH QUESTIONNAIRE - PHQ9
SUM OF ALL RESPONSES TO PHQ QUESTIONS 1-9: 0
1. LITTLE INTEREST OR PLEASURE IN DOING THINGS: NOT AT ALL
2. FEELING DOWN, DEPRESSED OR HOPELESS: NOT AT ALL
SUM OF ALL RESPONSES TO PHQ QUESTIONS 1-9: 0
SUM OF ALL RESPONSES TO PHQ9 QUESTIONS 1 & 2: 0

## 2024-12-02 ASSESSMENT — LIFESTYLE VARIABLES
HOW MANY STANDARD DRINKS CONTAINING ALCOHOL DO YOU HAVE ON A TYPICAL DAY: 1 OR 2
HOW OFTEN DO YOU HAVE A DRINK CONTAINING ALCOHOL: MONTHLY OR LESS

## 2024-12-02 NOTE — PROGRESS NOTES
Medicare Annual Wellness Visit    Misael Calero is here for Medicare AWV (F/u as well from previous visit. Still waiting for neurosurgery to schedule for neck surgery. )    Assessment & Plan   Medicare annual wellness visit, subsequent  Comments:  AWV completed in office today, see interventions below.  Patient declined cognitive exam.  PSA screening deferred until his urology appointment later this month  Cervical radiculopathy  Comments:  Increase gabapentin to 300 mg twice daily.  Follow-up with neurosurgery as scheduled  Orders:  -     gabapentin (NEURONTIN) 300 MG capsule; Take 1 capsule by mouth in the morning and at bedtime for 60 days. Max Daily Amount: 600 mg, Disp-120 capsule, R-0Adjust Sig    Recommendations for Preventive Services Due: see orders and patient instructions/AVS.  Recommended screening schedule for the next 5-10 years is provided to the patient in written form: see Patient Instructions/AVS.     Return in 3 months (on 3/2/2025).     Subjective   The following acute and/or chronic problems were also addressed today:    Cervical radiculopathy: Patient has been seen by orthopedics and neurosurgery and was told that he has cervical spinal stenosis, with radicular symptoms.  He has been taking gabapentin 300 mg nightly with improvement in hand pain and sleep.  He is still having difficulty with ADLs to include shaving, cooking, and brushing his teeth given the pain in his hands.    Patient's complete Health Risk Assessment and screening values have been reviewed and are found in Flowsheets. The following problems were reviewed today and where indicated follow up appointments were made and/or referrals ordered.    Positive Risk Factor Screenings with Interventions:            Self-assessment of health:  In general, how would you say your health is?: (!) Poor    Interventions:  Secondary to cervical spinal stenocsis and cervical     General HRA Questions:  Select all that apply: (!) New or

## 2024-12-02 NOTE — PATIENT INSTRUCTIONS

## 2024-12-02 NOTE — PROGRESS NOTES
\"Have you been to the ER, urgent care clinic since your last visit?  Hospitalized since your last visit?\"    YES - When: approximately 8 months ago.  Where and Why: due to fall/ panic attack.    “Have you seen or consulted any other health care providers outside our system since your last visit?”    NO

## 2024-12-06 DIAGNOSIS — A60.00 HERPESVIRAL INFECTION OF UROGENITAL SYSTEM, UNSPECIFIED: ICD-10-CM

## 2024-12-06 RX ORDER — ACYCLOVIR 200 MG/1
400 CAPSULE ORAL DAILY
Qty: 180 CAPSULE | Refills: 1 | Status: SHIPPED | OUTPATIENT
Start: 2024-12-06

## 2024-12-09 LAB — PSA, EXTERNAL: 2.69

## 2024-12-11 ENCOUNTER — HOSPITAL ENCOUNTER (OUTPATIENT)
Facility: HOSPITAL | Age: 74
Discharge: HOME | End: 2024-12-14
Payer: MEDICARE

## 2024-12-11 VITALS
SYSTOLIC BLOOD PRESSURE: 118 MMHG | BODY MASS INDEX: 29.32 KG/M2 | HEIGHT: 72 IN | OXYGEN SATURATION: 97 % | RESPIRATION RATE: 16 BRPM | TEMPERATURE: 98.1 F | DIASTOLIC BLOOD PRESSURE: 62 MMHG | HEART RATE: 77 BPM | WEIGHT: 216.49 LBS

## 2024-12-11 LAB
ABO + RH BLD: NORMAL
ALBUMIN SERPL-MCNC: 3.4 G/DL (ref 3.5–5)
ALBUMIN/GLOB SERPL: 0.9 (ref 1.1–2.2)
ALP SERPL-CCNC: 124 U/L (ref 45–117)
ALT SERPL-CCNC: 18 U/L (ref 12–78)
ANION GAP SERPL CALC-SCNC: 3 MMOL/L (ref 2–12)
APPEARANCE UR: ABNORMAL
APTT PPP: 27.8 SEC (ref 22.1–31)
AST SERPL-CCNC: 9 U/L (ref 15–37)
BACTERIA URNS QL MICRO: ABNORMAL /HPF
BASOPHILS # BLD: 0.1 K/UL (ref 0–0.1)
BASOPHILS NFR BLD: 1 % (ref 0–1)
BILIRUB SERPL-MCNC: 0.3 MG/DL (ref 0.2–1)
BILIRUB UR QL: NEGATIVE
BLOOD GROUP ANTIBODIES SERPL: NORMAL
BUN SERPL-MCNC: 26 MG/DL (ref 6–20)
BUN/CREAT SERPL: 27 (ref 12–20)
CALCIUM SERPL-MCNC: 8.9 MG/DL (ref 8.5–10.1)
CHLORIDE SERPL-SCNC: 109 MMOL/L (ref 97–108)
CO2 SERPL-SCNC: 26 MMOL/L (ref 21–32)
COLOR UR: ABNORMAL
CREAT SERPL-MCNC: 0.97 MG/DL (ref 0.7–1.3)
DIFFERENTIAL METHOD BLD: ABNORMAL
EOSINOPHIL # BLD: 0.4 K/UL (ref 0–0.4)
EOSINOPHIL NFR BLD: 3 % (ref 0–7)
EPITH CASTS URNS QL MICRO: ABNORMAL /LPF
ERYTHROCYTE [DISTWIDTH] IN BLOOD BY AUTOMATED COUNT: 16.3 % (ref 11.5–14.5)
EST. AVERAGE GLUCOSE BLD GHB EST-MCNC: 131 MG/DL
GLOBULIN SER CALC-MCNC: 3.7 G/DL (ref 2–4)
GLUCOSE SERPL-MCNC: 114 MG/DL (ref 65–100)
GLUCOSE UR STRIP.AUTO-MCNC: NEGATIVE MG/DL
HBA1C MFR BLD: 6.2 % (ref 4–5.6)
HCT VFR BLD AUTO: 36.1 % (ref 36.6–50.3)
HGB BLD-MCNC: 11.3 G/DL (ref 12.1–17)
HGB UR QL STRIP: NEGATIVE
IMM GRANULOCYTES # BLD AUTO: 0.1 K/UL (ref 0–0.04)
IMM GRANULOCYTES NFR BLD AUTO: 1 % (ref 0–0.5)
INR PPP: 1 (ref 0.9–1.1)
KETONES UR QL STRIP.AUTO: ABNORMAL MG/DL
LEUKOCYTE ESTERASE UR QL STRIP.AUTO: NEGATIVE
LYMPHOCYTES # BLD: 1.3 K/UL (ref 0.8–3.5)
LYMPHOCYTES NFR BLD: 12 % (ref 12–49)
MCH RBC QN AUTO: 26.4 PG (ref 26–34)
MCHC RBC AUTO-ENTMCNC: 31.3 G/DL (ref 30–36.5)
MCV RBC AUTO: 84.3 FL (ref 80–99)
MONOCYTES # BLD: 0.8 K/UL (ref 0–1)
MONOCYTES NFR BLD: 7 % (ref 5–13)
NEUTS SEG # BLD: 8.2 K/UL (ref 1.8–8)
NEUTS SEG NFR BLD: 76 % (ref 32–75)
NITRITE UR QL STRIP.AUTO: NEGATIVE
NRBC # BLD: 0 K/UL (ref 0–0.01)
NRBC BLD-RTO: 0 PER 100 WBC
PH UR STRIP: 5.5 (ref 5–8)
PLATELET # BLD AUTO: 463 K/UL (ref 150–400)
PMV BLD AUTO: 9.2 FL (ref 8.9–12.9)
POTASSIUM SERPL-SCNC: 3.8 MMOL/L (ref 3.5–5.1)
PROT SERPL-MCNC: 7.1 G/DL (ref 6.4–8.2)
PROT UR STRIP-MCNC: 30 MG/DL
PROTHROMBIN TIME: 10.3 SEC (ref 9–11.1)
RBC # BLD AUTO: 4.28 M/UL (ref 4.1–5.7)
RBC #/AREA URNS HPF: ABNORMAL /HPF (ref 0–5)
SODIUM SERPL-SCNC: 138 MMOL/L (ref 136–145)
SP GR UR REFRACTOMETRY: 1.03
SPECIMEN EXP DATE BLD: NORMAL
THERAPEUTIC RANGE: NORMAL SECS (ref 58–77)
URINE CULTURE IF INDICATED: ABNORMAL
UROBILINOGEN UR QL STRIP.AUTO: 1 EU/DL (ref 0.2–1)
WBC # BLD AUTO: 10.7 K/UL (ref 4.1–11.1)
WBC URNS QL MICRO: ABNORMAL /HPF (ref 0–4)
YEAST BUDDING URNS QL: PRESENT

## 2024-12-11 PROCEDURE — 85730 THROMBOPLASTIN TIME PARTIAL: CPT

## 2024-12-11 PROCEDURE — 81001 URINALYSIS AUTO W/SCOPE: CPT

## 2024-12-11 PROCEDURE — 80053 COMPREHEN METABOLIC PANEL: CPT

## 2024-12-11 PROCEDURE — 85610 PROTHROMBIN TIME: CPT

## 2024-12-11 PROCEDURE — 85025 COMPLETE CBC W/AUTO DIFF WBC: CPT

## 2024-12-11 PROCEDURE — 97530 THERAPEUTIC ACTIVITIES: CPT

## 2024-12-11 PROCEDURE — 83036 HEMOGLOBIN GLYCOSYLATED A1C: CPT

## 2024-12-11 PROCEDURE — 36415 COLL VENOUS BLD VENIPUNCTURE: CPT

## 2024-12-11 PROCEDURE — 86901 BLOOD TYPING SEROLOGIC RH(D): CPT

## 2024-12-11 PROCEDURE — 86900 BLOOD TYPING SEROLOGIC ABO: CPT

## 2024-12-11 PROCEDURE — 97161 PT EVAL LOW COMPLEX 20 MIN: CPT

## 2024-12-11 PROCEDURE — 86850 RBC ANTIBODY SCREEN: CPT

## 2024-12-11 RX ORDER — CEFAZOLIN SODIUM/WATER 2 G/20 ML
2000 SYRINGE (ML) INTRAVENOUS ONCE
OUTPATIENT
Start: 2024-12-19

## 2024-12-11 RX ORDER — ACETAMINOPHEN 500 MG
1000 TABLET ORAL ONCE
OUTPATIENT
Start: 2024-12-19

## 2024-12-11 RX ORDER — SODIUM CHLORIDE, SODIUM LACTATE, POTASSIUM CHLORIDE, CALCIUM CHLORIDE 600; 310; 30; 20 MG/100ML; MG/100ML; MG/100ML; MG/100ML
INJECTION, SOLUTION INTRAVENOUS CONTINUOUS
OUTPATIENT
Start: 2024-12-19

## 2024-12-11 RX ORDER — CHONDRO SU A/VIT C/MANGANESE 400-60-2.5
1 CAPSULE ORAL DAILY
COMMUNITY

## 2024-12-11 RX ORDER — PREGABALIN 150 MG/1
150 CAPSULE ORAL ONCE
OUTPATIENT
Start: 2024-12-19

## 2024-12-11 RX ORDER — NAPROXEN 500 MG/1
500 TABLET ORAL 2 TIMES DAILY WITH MEALS
COMMUNITY
Start: 2024-10-28 | End: 2025-01-26

## 2024-12-11 ASSESSMENT — PAIN DESCRIPTION - LOCATION: LOCATION: BACK;NECK

## 2024-12-11 ASSESSMENT — PAIN SCALES - GENERAL: PAINLEVEL_OUTOF10: 2

## 2024-12-12 LAB
BACTERIA SPEC CULT: NORMAL
BACTERIA SPEC CULT: NORMAL
SERVICE CMNT-IMP: NORMAL

## 2024-12-13 NOTE — PROGRESS NOTES
[facility]  Joint/Spine Preoperative Instructions        Surgery Date 12/19/24          Time of Arrival to be called @ [phone]     1. On the day of your surgery, please report to the Surgical Services Registration Desk and sign in at your designated time. The Surgery Center is located to the right of the Emergency Room.     2. You must have someone with you to drive you home. You should not drive a car for 24 hours following surgery. Please make arrangements for a friend or family member to stay with you for the first 24 hours after your surgery.    3. No food after midnight 12/18/24.  Medications morning of surgery should be taken with a sip of water.  Please follow pre-surgery drink instructions that were given at your Pre Admission Testing appointment.      4. We recommend you do not drink any alcoholic beverages for 24 hours before and after your surgery.    5. Contact your surgeon’s office for instructions on the following medications: non-steroidal anti-inflammatory drugs (i.e. Advil, Aleve), vitamins, and supplements. (Some surgeon’s will want you to stop these medications prior to surgery and others may allow you to take them)  **If you are currently taking Plavix, Coumadin, Aspirin and/or other blood-thinning agents, contact your surgeon for instructions.** Your surgeon will partner with the physician prescribing these medications to determine if it is safe to stop or if you need to continue taking.  Please do not stop taking these medications without instructions from your surgeon    6. Wear comfortable clothes.  Wear glasses instead of contacts.  Do not bring any money or jewelry. Please bring picture ID, insurance card, and any prearranged co-payment or hospital payment.  Do not wear make-up, particularly mascara the morning of your surgery.  Do not wear nail polish, particularly if you are having foot /hand surgery.  Wear your hair loose or down, no ponytails, 
Called and left message for [employer] to fax patient's H&P to PAT.   
Incentive Spirometer        Using the incentive spirometer helps expand the small air sacs of your lungs, helps you breathe deeply, and helps improve your lung function.  Use your incentive spirometer twice a day (10 breaths each time) prior to surgery.      How to Use Your Incentive Spirometer:  Hold the incentive spirometer in an upright position.   Breathe out as usual.   Place the mouthpiece in your mouth and seal your lips tightly around it.   Take a deep breath.  Breathe in slowly and as deeply as possible. Keep the blue flow rate guide between the arrows.   Hold your breath as long as possible. Then exhale slowly and allow the piston to fall to the bottom of the column.   Rest for a few seconds and repeat steps one through five at least 10 times.     PAT Tidal Volume___2500_______________  x__2______________  Date__12/11/24_____________________    BRING THE INCENTIVE SPIROMETER WITH YOU TO THE HOSPITAL ON THE DAY OF YOUR SURGERY.  Opportunity given to ask and answer questions as well as to observe return demonstration.    Patient signature_____________________________    Witness____________________________  
Orthopedic and Spine Patients:  Instructions on When You Can   Eat or Drink Before Surgery      You have been provided a pre-surgery drink received at your pre-admission testing appointment.    Night before surgery:  You should drink 1/2 bottle of the  pre-surgery drink at bedtime. No food after midnight!    Day of Surgery:  Complete 2nd half of the bottle of the pre-surgery drink 1 hour prior to arrival at hospital.  For questions call Pre-Admission Testing at [phone].  They are available from 8:00am-5:00pm, Monday through Friday.  
The [facility]  \"We've Got Your Back! Caring For Yourself Before and After Spine Surgery\" handbook was provided & reviewed with the patient during the pre-admission testing (PAT) appointment. An opportunity for questions was provided, patient verbalized understanding.   
healing and prevent you from healing completely.    If you lose your Hibiclens/chlorhexidine, please call the Surgery Center, or it is available for purchase at your pharmacy.               ___________________      ___________________      ________________  (Signature of Patient)          (Witness)                   (Date and Time)  
        [id number] Therapeutic Activity (timed):  use of dynamic activities replicating functional movements to increase ROM, strength, coordination, balance, and proprioception in order to improve patient's ability to progress to PLOF and address remaining functional goals.  (see flow sheet as applicable)     Details if applicable:     12 Total          Radicular Pain:   Pain in bilateral shoulders, stabbing into right hand, right lower leg    Activity Tolerance:   Good    Patient does not state signs/symptoms of shortness of breath/dyspnea on exertion/respiratory distress.  COMMUNICATION OF ABOVE EDUCATION:     The patient's plan of care was discussed as follows:   [x]         The patient verbalized understanding of her plan in preparation for their upcoming surgery  []         The patient's  was present for this session  []        The patient reports that he/she does not have a  identified at this time  []         The  verbalized understanding of the education regarding the patient's upcoming surgery  [x]         Patient/family agree to work toward stated goals and plan of care.  []         Patient understands intent and goals of therapy, but is neutral about his/her participation.  []         Patient is unable to participate in goal setting and plan of care.      Thank you for this referral.  Samantha Calero, PT      Time  In / Out       Total Treatment Time     Total Timed Codes     1:1 Treatment Time        [facility] Totals Reminder:  bill using total billable   min of TIMED therapeutic procedures and modalities.   8-22 min = 1 unit; 23-37 min = 2 units; 38-52 min = 3 units;  53-67 min = 4 units; 68-82 min = 5 units     Patient  verified yes     Visit #   Current  / Total 1 1         Physical Therapy Evaluation Charge Determination   History Examination Presentation Decision-Making   MEDIUM  Complexity : 1-2 comorbidities / personal factors will impact the outcome/ POC  LOW Complexity : 1-2 Standardized

## 2024-12-17 ENCOUNTER — ANESTHESIA EVENT (OUTPATIENT)
Facility: HOSPITAL | Age: 74
End: 2024-12-17
Payer: MEDICARE

## 2024-12-18 NOTE — ANESTHESIA PRE PROCEDURE
ECGs available        Neuro/Psych:   (+) psychiatric history:depression/anxiety              ROS comment: Cervical spinal stenosis    Sensorineural hearing loss, bilateral GI/Hepatic/Renal:   (+) GERD:         ROS comment: Hx Appendectomy.   Endo/Other:    (+) DiabetesType II DM, blood dyscrasia (Hgb = 11.3 on 12/11/24): anemia:..                  ROS comment: ETOH - 9.0 standard drinks of alcohol per week Abdominal: normal exam            Vascular:          Other Findings:       Anesthesia Plan      general     ASA 3       Induction: intravenous.  BIS  MIPS: Postoperative opioids intended and Prophylactic antiemetics administered.  Anesthetic plan and risks discussed with patient.      Plan discussed with CRNA.                Ayad Rivera MD   12/17/2024

## 2024-12-18 NOTE — DISCHARGE INSTRUCTIONS
SPINE DISCHARGE  INSTRUCTIONS    Pio Gar M.D.      What can I do?  The only exercise you should do is walking.  Start by walking twice a day for 5 minutes, then increase by  2-3 minutes every day until you reach 25 minutes twice a day.  DO NOT sit for long periods of time (20 minutes at a time for the first couple of weeks, then gradually increase.  No heavy lifting (5 lbs max); no straining, twisting, or bending.  No driving until your physician tells you it is ok.  It is, however, ok to ride short distances in a car.  If you are required to wear a back brace, you may remove it when you are sleeping unless your doctor has advised against it.  If you are required to wear a cervical collar, you must sleep in it.  You can remove it only for showers.    What can I eat?   You may resume your regular home diet as tolerated.  If your throat is sore, you may want to eat soft food for the first few days.    When can I take a shower?  You may shower leaving on your occlusive (waterproof) dressing on allowing water to run over the dressing.  The dressing may be removed in 5 days.  The small pieces of tape (steri strips)  underneath it should stay on.  Let water run over them, then pat dry gently.  Do not take baths or soak in pools.  You may remove your brace for showering.    Medications:  Check with your physician before taking any anti-inflammatory medicines (Advil, Aleve, ibuprofen, aspirin).  Take prescription medicine as directed.  DO NOT take more than prescribed.  Call your physician if the prescribed dose is not sufficiently controlling your pain.  It is important to have regular bowel movements.  Pain medications may cause constipation.  Drink plenty of fluids, get enough fiber in your diet, and use stool softeners and drink prune juice to help prevent constipation.  Do not take laxatives if at all possible except in severe situations.  It can result in a vicious cycle of constipation and diarrhea.  Do not put

## 2024-12-19 ENCOUNTER — ANESTHESIA (OUTPATIENT)
Facility: HOSPITAL | Age: 74
End: 2024-12-19
Payer: MEDICARE

## 2024-12-19 ENCOUNTER — APPOINTMENT (OUTPATIENT)
Facility: HOSPITAL | Age: 74
DRG: 472 | End: 2024-12-19
Attending: NEUROLOGICAL SURGERY
Payer: MEDICARE

## 2024-12-19 ENCOUNTER — HOSPITAL ENCOUNTER (INPATIENT)
Facility: HOSPITAL | Age: 74
LOS: 1 days | Discharge: HOME | DRG: 472 | End: 2024-12-20
Attending: NEUROLOGICAL SURGERY | Admitting: NEUROLOGICAL SURGERY
Payer: MEDICARE

## 2024-12-19 DIAGNOSIS — G99.2 MYELOPATHY CONCURRENT WITH AND DUE TO SPINAL STENOSIS OF CERVICAL REGION (HCC): Primary | ICD-10-CM

## 2024-12-19 DIAGNOSIS — M48.02 MYELOPATHY CONCURRENT WITH AND DUE TO SPINAL STENOSIS OF CERVICAL REGION (HCC): Primary | ICD-10-CM

## 2024-12-19 LAB
GLUCOSE BLD STRIP.AUTO-MCNC: 155 MG/DL (ref 65–117)
GLUCOSE BLD STRIP.AUTO-MCNC: 189 MG/DL (ref 65–117)
SERVICE CMNT-IMP: ABNORMAL
SERVICE CMNT-IMP: ABNORMAL

## 2024-12-19 PROCEDURE — 7100000000 HC PACU RECOVERY - FIRST 15 MIN: Performed by: NEUROLOGICAL SURGERY

## 2024-12-19 PROCEDURE — C1729 CATH, DRAINAGE: HCPCS | Performed by: NEUROLOGICAL SURGERY

## 2024-12-19 PROCEDURE — 0RG2071 FUSION OF 2 OR MORE CERVICAL VERTEBRAL JOINTS WITH AUTOLOGOUS TISSUE SUBSTITUTE, POSTERIOR APPROACH, POSTERIOR COLUMN, OPEN APPROACH: ICD-10-PCS | Performed by: NEUROLOGICAL SURGERY

## 2024-12-19 PROCEDURE — 6360000002 HC RX W HCPCS: Performed by: NURSE ANESTHETIST, CERTIFIED REGISTERED

## 2024-12-19 PROCEDURE — 2500000003 HC RX 250 WO HCPCS

## 2024-12-19 PROCEDURE — G0378 HOSPITAL OBSERVATION PER HR: HCPCS

## 2024-12-19 PROCEDURE — C1713 ANCHOR/SCREW BN/BN,TIS/BN: HCPCS | Performed by: NEUROLOGICAL SURGERY

## 2024-12-19 PROCEDURE — 2500000003 HC RX 250 WO HCPCS: Performed by: NEUROLOGICAL SURGERY

## 2024-12-19 PROCEDURE — 3600000004 HC SURGERY LEVEL 4 BASE: Performed by: NEUROLOGICAL SURGERY

## 2024-12-19 PROCEDURE — 6360000002 HC RX W HCPCS: Performed by: NEUROLOGICAL SURGERY

## 2024-12-19 PROCEDURE — L0120 CERV FLEX N/ADJ FOAM PRE OTS: HCPCS | Performed by: NEUROLOGICAL SURGERY

## 2024-12-19 PROCEDURE — 2580000003 HC RX 258: Performed by: ANESTHESIOLOGY

## 2024-12-19 PROCEDURE — 6370000000 HC RX 637 (ALT 250 FOR IP): Performed by: NEUROLOGICAL SURGERY

## 2024-12-19 PROCEDURE — 3600000014 HC SURGERY LEVEL 4 ADDTL 15MIN: Performed by: NEUROLOGICAL SURGERY

## 2024-12-19 PROCEDURE — 2709999900 HC NON-CHARGEABLE SUPPLY: Performed by: NEUROLOGICAL SURGERY

## 2024-12-19 PROCEDURE — 6360000002 HC RX W HCPCS

## 2024-12-19 PROCEDURE — 72040 X-RAY EXAM NECK SPINE 2-3 VW: CPT

## 2024-12-19 PROCEDURE — 7100000001 HC PACU RECOVERY - ADDTL 15 MIN: Performed by: NEUROLOGICAL SURGERY

## 2024-12-19 PROCEDURE — 2580000003 HC RX 258

## 2024-12-19 PROCEDURE — 01N10ZZ RELEASE CERVICAL NERVE, OPEN APPROACH: ICD-10-PCS | Performed by: NEUROLOGICAL SURGERY

## 2024-12-19 PROCEDURE — 3700000001 HC ADD 15 MINUTES (ANESTHESIA): Performed by: NEUROLOGICAL SURGERY

## 2024-12-19 PROCEDURE — 76000 FLUOROSCOPY <1 HR PHYS/QHP: CPT

## 2024-12-19 PROCEDURE — 2720000010 HC SURG SUPPLY STERILE: Performed by: NEUROLOGICAL SURGERY

## 2024-12-19 PROCEDURE — 82962 GLUCOSE BLOOD TEST: CPT

## 2024-12-19 PROCEDURE — 3700000000 HC ANESTHESIA ATTENDED CARE: Performed by: NEUROLOGICAL SURGERY

## 2024-12-19 DEVICE — ALLOGRAFT BNE SM 5 CC DBM FIBER OSTEOSTRAND PLUS: Type: IMPLANTABLE DEVICE | Site: SPINE CERVICAL | Status: FUNCTIONAL

## 2024-12-19 DEVICE — CONTOURED TI ROD, 3.5MM X 60MM
Type: IMPLANTABLE DEVICE | Site: SPINE CERVICAL | Status: FUNCTIONAL
Brand: NORTHSTAR

## 2024-12-19 DEVICE — NORTHSTAR OCT SET SCREW
Type: IMPLANTABLE DEVICE | Site: SPINE CERVICAL | Status: FUNCTIONAL
Brand: NORTHSTAR

## 2024-12-19 DEVICE — POLY. SCREW, 3.5MM X 14MM
Type: IMPLANTABLE DEVICE | Site: SPINE CERVICAL | Status: FUNCTIONAL
Brand: NORTHSTAR

## 2024-12-19 RX ORDER — SENNA AND DOCUSATE SODIUM 50; 8.6 MG/1; MG/1
1 TABLET, FILM COATED ORAL 2 TIMES DAILY
Status: DISCONTINUED | OUTPATIENT
Start: 2024-12-19 | End: 2024-12-20 | Stop reason: HOSPADM

## 2024-12-19 RX ORDER — AMLODIPINE BESYLATE 5 MG/1
10 TABLET ORAL DAILY
Status: DISCONTINUED | OUTPATIENT
Start: 2024-12-20 | End: 2024-12-20 | Stop reason: HOSPADM

## 2024-12-19 RX ORDER — ATORVASTATIN CALCIUM 40 MG/1
80 TABLET, FILM COATED ORAL NIGHTLY
Status: DISCONTINUED | OUTPATIENT
Start: 2024-12-19 | End: 2024-12-20 | Stop reason: HOSPADM

## 2024-12-19 RX ORDER — SODIUM CHLORIDE 0.9 % (FLUSH) 0.9 %
5-40 SYRINGE (ML) INJECTION EVERY 12 HOURS SCHEDULED
Status: DISCONTINUED | OUTPATIENT
Start: 2024-12-19 | End: 2024-12-19 | Stop reason: HOSPADM

## 2024-12-19 RX ORDER — DIPHENHYDRAMINE HCL 25 MG
25 CAPSULE ORAL EVERY 6 HOURS PRN
Status: DISCONTINUED | OUTPATIENT
Start: 2024-12-19 | End: 2024-12-20 | Stop reason: HOSPADM

## 2024-12-19 RX ORDER — ACETAMINOPHEN 325 MG/1
650 TABLET ORAL EVERY 6 HOURS
Status: DISCONTINUED | OUTPATIENT
Start: 2024-12-19 | End: 2024-12-20 | Stop reason: HOSPADM

## 2024-12-19 RX ORDER — CYCLOBENZAPRINE HCL 10 MG
10 TABLET ORAL EVERY 12 HOURS PRN
Status: DISCONTINUED | OUTPATIENT
Start: 2024-12-19 | End: 2024-12-20 | Stop reason: HOSPADM

## 2024-12-19 RX ORDER — SUCCINYLCHOLINE/SOD CL,ISO/PF 200MG/10ML
SYRINGE (ML) INTRAVENOUS
Status: DISCONTINUED | OUTPATIENT
Start: 2024-12-19 | End: 2024-12-19 | Stop reason: SDUPTHER

## 2024-12-19 RX ORDER — HYDROCHLOROTHIAZIDE 25 MG/1
25 TABLET ORAL DAILY
Status: DISCONTINUED | OUTPATIENT
Start: 2024-12-20 | End: 2024-12-20 | Stop reason: HOSPADM

## 2024-12-19 RX ORDER — LOSARTAN POTASSIUM 100 MG/1
100 TABLET ORAL DAILY
Status: DISCONTINUED | OUTPATIENT
Start: 2024-12-20 | End: 2024-12-20 | Stop reason: HOSPADM

## 2024-12-19 RX ORDER — PROCHLORPERAZINE EDISYLATE 5 MG/ML
5 INJECTION INTRAMUSCULAR; INTRAVENOUS
Status: DISCONTINUED | OUTPATIENT
Start: 2024-12-19 | End: 2024-12-19 | Stop reason: HOSPADM

## 2024-12-19 RX ORDER — LOSARTAN POTASSIUM AND HYDROCHLOROTHIAZIDE 25; 100 MG/1; MG/1
1 TABLET ORAL DAILY
Status: DISCONTINUED | OUTPATIENT
Start: 2024-12-19 | End: 2024-12-19

## 2024-12-19 RX ORDER — GLYCOPYRROLATE 0.2 MG/ML
INJECTION INTRAMUSCULAR; INTRAVENOUS
Status: DISCONTINUED | OUTPATIENT
Start: 2024-12-19 | End: 2024-12-19 | Stop reason: SDUPTHER

## 2024-12-19 RX ORDER — ONDANSETRON 4 MG/1
4 TABLET, ORALLY DISINTEGRATING ORAL EVERY 8 HOURS PRN
Status: DISCONTINUED | OUTPATIENT
Start: 2024-12-19 | End: 2024-12-20 | Stop reason: HOSPADM

## 2024-12-19 RX ORDER — ACYCLOVIR 200 MG/1
400 CAPSULE ORAL DAILY
Status: DISCONTINUED | OUTPATIENT
Start: 2024-12-20 | End: 2024-12-20 | Stop reason: HOSPADM

## 2024-12-19 RX ORDER — SODIUM CHLORIDE 9 MG/ML
INJECTION, SOLUTION INTRAVENOUS CONTINUOUS
Status: DISCONTINUED | OUTPATIENT
Start: 2024-12-19 | End: 2024-12-20 | Stop reason: HOSPADM

## 2024-12-19 RX ORDER — OXYCODONE HYDROCHLORIDE 5 MG/1
5 TABLET ORAL EVERY 4 HOURS PRN
Status: DISCONTINUED | OUTPATIENT
Start: 2024-12-19 | End: 2024-12-20 | Stop reason: HOSPADM

## 2024-12-19 RX ORDER — SODIUM CHLORIDE 0.9 % (FLUSH) 0.9 %
5-40 SYRINGE (ML) INJECTION PRN
Status: DISCONTINUED | OUTPATIENT
Start: 2024-12-19 | End: 2024-12-20 | Stop reason: HOSPADM

## 2024-12-19 RX ORDER — ONDANSETRON 2 MG/ML
4 INJECTION INTRAMUSCULAR; INTRAVENOUS EVERY 6 HOURS PRN
Status: DISCONTINUED | OUTPATIENT
Start: 2024-12-19 | End: 2024-12-20 | Stop reason: HOSPADM

## 2024-12-19 RX ORDER — LIDOCAINE HYDROCHLORIDE 20 MG/ML
INJECTION, SOLUTION EPIDURAL; INFILTRATION; INTRACAUDAL; PERINEURAL
Status: DISCONTINUED | OUTPATIENT
Start: 2024-12-19 | End: 2024-12-19 | Stop reason: SDUPTHER

## 2024-12-19 RX ORDER — ACETAMINOPHEN 500 MG
1000 TABLET ORAL ONCE
Status: COMPLETED | OUTPATIENT
Start: 2024-12-19 | End: 2024-12-19

## 2024-12-19 RX ORDER — FENTANYL CITRATE 50 UG/ML
50 INJECTION, SOLUTION INTRAMUSCULAR; INTRAVENOUS EVERY 5 MIN PRN
Status: DISCONTINUED | OUTPATIENT
Start: 2024-12-19 | End: 2024-12-19 | Stop reason: HOSPADM

## 2024-12-19 RX ORDER — ONDANSETRON 2 MG/ML
INJECTION INTRAMUSCULAR; INTRAVENOUS
Status: DISCONTINUED | OUTPATIENT
Start: 2024-12-19 | End: 2024-12-19 | Stop reason: SDUPTHER

## 2024-12-19 RX ORDER — DIPHENHYDRAMINE HYDROCHLORIDE 50 MG/ML
25 INJECTION INTRAMUSCULAR; INTRAVENOUS EVERY 6 HOURS PRN
Status: DISCONTINUED | OUTPATIENT
Start: 2024-12-19 | End: 2024-12-20 | Stop reason: HOSPADM

## 2024-12-19 RX ORDER — PREGABALIN 150 MG/1
150 CAPSULE ORAL ONCE
Status: COMPLETED | OUTPATIENT
Start: 2024-12-19 | End: 2024-12-19

## 2024-12-19 RX ORDER — SODIUM CHLORIDE 9 MG/ML
INJECTION, SOLUTION INTRAVENOUS PRN
Status: DISCONTINUED | OUTPATIENT
Start: 2024-12-19 | End: 2024-12-19 | Stop reason: HOSPADM

## 2024-12-19 RX ORDER — HYDROMORPHONE HYDROCHLORIDE 1 MG/ML
0.25 INJECTION, SOLUTION INTRAMUSCULAR; INTRAVENOUS; SUBCUTANEOUS EVERY 5 MIN PRN
Status: DISCONTINUED | OUTPATIENT
Start: 2024-12-19 | End: 2024-12-19 | Stop reason: HOSPADM

## 2024-12-19 RX ORDER — SODIUM CHLORIDE 0.9 % (FLUSH) 0.9 %
5-40 SYRINGE (ML) INJECTION PRN
Status: DISCONTINUED | OUTPATIENT
Start: 2024-12-19 | End: 2024-12-19 | Stop reason: HOSPADM

## 2024-12-19 RX ORDER — SERTRALINE HYDROCHLORIDE 25 MG/1
25 TABLET, FILM COATED ORAL DAILY
Status: DISCONTINUED | OUTPATIENT
Start: 2024-12-20 | End: 2024-12-20 | Stop reason: HOSPADM

## 2024-12-19 RX ORDER — DEXAMETHASONE SODIUM PHOSPHATE 4 MG/ML
INJECTION, SOLUTION INTRA-ARTICULAR; INTRALESIONAL; INTRAMUSCULAR; INTRAVENOUS; SOFT TISSUE
Status: DISCONTINUED | OUTPATIENT
Start: 2024-12-19 | End: 2024-12-19 | Stop reason: SDUPTHER

## 2024-12-19 RX ORDER — LIDOCAINE HYDROCHLORIDE AND EPINEPHRINE 10; 10 MG/ML; UG/ML
INJECTION, SOLUTION INFILTRATION; PERINEURAL PRN
Status: DISCONTINUED | OUTPATIENT
Start: 2024-12-19 | End: 2024-12-19 | Stop reason: ALTCHOICE

## 2024-12-19 RX ORDER — POLYETHYLENE GLYCOL 3350 17 G/17G
17 POWDER, FOR SOLUTION ORAL DAILY
Status: DISCONTINUED | OUTPATIENT
Start: 2024-12-19 | End: 2024-12-20 | Stop reason: HOSPADM

## 2024-12-19 RX ORDER — SODIUM CHLORIDE 0.9 % (FLUSH) 0.9 %
5-40 SYRINGE (ML) INJECTION EVERY 12 HOURS SCHEDULED
Status: DISCONTINUED | OUTPATIENT
Start: 2024-12-19 | End: 2024-12-20 | Stop reason: HOSPADM

## 2024-12-19 RX ORDER — SODIUM CHLORIDE, SODIUM LACTATE, POTASSIUM CHLORIDE, CALCIUM CHLORIDE 600; 310; 30; 20 MG/100ML; MG/100ML; MG/100ML; MG/100ML
INJECTION, SOLUTION INTRAVENOUS CONTINUOUS
Status: DISCONTINUED | OUTPATIENT
Start: 2024-12-19 | End: 2024-12-19 | Stop reason: HOSPADM

## 2024-12-19 RX ORDER — FENTANYL CITRATE 50 UG/ML
INJECTION, SOLUTION INTRAMUSCULAR; INTRAVENOUS
Status: DISCONTINUED | OUTPATIENT
Start: 2024-12-19 | End: 2024-12-19 | Stop reason: SDUPTHER

## 2024-12-19 RX ORDER — OXYCODONE HYDROCHLORIDE 5 MG/1
10 TABLET ORAL EVERY 4 HOURS PRN
Status: DISCONTINUED | OUTPATIENT
Start: 2024-12-19 | End: 2024-12-20 | Stop reason: HOSPADM

## 2024-12-19 RX ORDER — SODIUM CHLORIDE, SODIUM LACTATE, POTASSIUM CHLORIDE, CALCIUM CHLORIDE 600; 310; 30; 20 MG/100ML; MG/100ML; MG/100ML; MG/100ML
INJECTION, SOLUTION INTRAVENOUS
Status: DISCONTINUED | OUTPATIENT
Start: 2024-12-19 | End: 2024-12-19 | Stop reason: SDUPTHER

## 2024-12-19 RX ORDER — EPHEDRINE SULFATE 5 MG/ML
INJECTION INTRAVENOUS
Status: DISCONTINUED | OUTPATIENT
Start: 2024-12-19 | End: 2024-12-19 | Stop reason: SDUPTHER

## 2024-12-19 RX ORDER — ROCURONIUM BROMIDE 10 MG/ML
INJECTION, SOLUTION INTRAVENOUS
Status: DISCONTINUED | OUTPATIENT
Start: 2024-12-19 | End: 2024-12-19 | Stop reason: SDUPTHER

## 2024-12-19 RX ORDER — NEOSTIGMINE METHYLSULFATE 1 MG/ML
INJECTION, SOLUTION INTRAVENOUS
Status: DISCONTINUED | OUTPATIENT
Start: 2024-12-19 | End: 2024-12-19 | Stop reason: SDUPTHER

## 2024-12-19 RX ORDER — ASCORBIC ACID 500 MG
1000 TABLET ORAL DAILY
Status: DISCONTINUED | OUTPATIENT
Start: 2024-12-20 | End: 2024-12-20 | Stop reason: HOSPADM

## 2024-12-19 RX ORDER — PROPOFOL 10 MG/ML
INJECTION, EMULSION INTRAVENOUS
Status: DISCONTINUED | OUTPATIENT
Start: 2024-12-19 | End: 2024-12-19 | Stop reason: SDUPTHER

## 2024-12-19 RX ORDER — NALOXONE HYDROCHLORIDE 0.4 MG/ML
INJECTION, SOLUTION INTRAMUSCULAR; INTRAVENOUS; SUBCUTANEOUS PRN
Status: DISCONTINUED | OUTPATIENT
Start: 2024-12-19 | End: 2024-12-19 | Stop reason: HOSPADM

## 2024-12-19 RX ADMIN — HYDROMORPHONE HYDROCHLORIDE 0.5 MG: 1 INJECTION, SOLUTION INTRAMUSCULAR; INTRAVENOUS; SUBCUTANEOUS at 16:43

## 2024-12-19 RX ADMIN — PREGABALIN 150 MG: 150 CAPSULE ORAL at 12:25

## 2024-12-19 RX ADMIN — ONDANSETRON HYDROCHLORIDE 4 MG: 2 INJECTION, SOLUTION INTRAMUSCULAR; INTRAVENOUS at 14:43

## 2024-12-19 RX ADMIN — Medication 140 MG: at 14:28

## 2024-12-19 RX ADMIN — SODIUM CHLORIDE, POTASSIUM CHLORIDE, SODIUM LACTATE AND CALCIUM CHLORIDE: 600; 310; 30; 20 INJECTION, SOLUTION INTRAVENOUS at 14:21

## 2024-12-19 RX ADMIN — SODIUM CHLORIDE, POTASSIUM CHLORIDE, SODIUM LACTATE AND CALCIUM CHLORIDE: 600; 310; 30; 20 INJECTION, SOLUTION INTRAVENOUS at 15:23

## 2024-12-19 RX ADMIN — Medication 4 MG: at 17:03

## 2024-12-19 RX ADMIN — ACETAMINOPHEN 650 MG: 325 TABLET ORAL at 20:50

## 2024-12-19 RX ADMIN — HYDROMORPHONE HYDROCHLORIDE 0.5 MG: 1 INJECTION, SOLUTION INTRAMUSCULAR; INTRAVENOUS; SUBCUTANEOUS at 15:27

## 2024-12-19 RX ADMIN — Medication 3 AMPULE: at 12:26

## 2024-12-19 RX ADMIN — PHENYLEPHRINE HYDROCHLORIDE 100 MCG: 10 INJECTION INTRAVENOUS at 14:32

## 2024-12-19 RX ADMIN — SODIUM CHLORIDE, POTASSIUM CHLORIDE, SODIUM LACTATE AND CALCIUM CHLORIDE: 600; 310; 30; 20 INJECTION, SOLUTION INTRAVENOUS at 12:26

## 2024-12-19 RX ADMIN — GLYCOPYRROLATE 0.8 MG: 0.2 INJECTION INTRAMUSCULAR; INTRAVENOUS at 17:03

## 2024-12-19 RX ADMIN — ROCURONIUM BROMIDE 20 MG: 10 INJECTION INTRAVENOUS at 15:04

## 2024-12-19 RX ADMIN — ATORVASTATIN CALCIUM 80 MG: 40 TABLET, FILM COATED ORAL at 20:49

## 2024-12-19 RX ADMIN — EPHEDRINE SULFATE 10 MG: 5 INJECTION INTRAVENOUS at 14:56

## 2024-12-19 RX ADMIN — SENNOSIDES AND DOCUSATE SODIUM 1 TABLET: 50; 8.6 TABLET ORAL at 20:53

## 2024-12-19 RX ADMIN — LIDOCAINE HYDROCHLORIDE 100 MG: 20 INJECTION, SOLUTION EPIDURAL; INFILTRATION; INTRACAUDAL; PERINEURAL at 14:28

## 2024-12-19 RX ADMIN — ROCURONIUM BROMIDE 5 MG: 10 INJECTION INTRAVENOUS at 14:28

## 2024-12-19 RX ADMIN — FENTANYL CITRATE 25 MCG: 50 INJECTION, SOLUTION INTRAMUSCULAR; INTRAVENOUS at 14:28

## 2024-12-19 RX ADMIN — PHENYLEPHRINE HYDROCHLORIDE 80 MCG: 10 INJECTION INTRAVENOUS at 15:45

## 2024-12-19 RX ADMIN — FENTANYL CITRATE 25 MCG: 50 INJECTION, SOLUTION INTRAMUSCULAR; INTRAVENOUS at 15:10

## 2024-12-19 RX ADMIN — ACETAMINOPHEN 1000 MG: 500 TABLET ORAL at 12:25

## 2024-12-19 RX ADMIN — WATER 2000 MG: 1 INJECTION INTRAMUSCULAR; INTRAVENOUS; SUBCUTANEOUS at 23:28

## 2024-12-19 RX ADMIN — SODIUM CHLORIDE, PRESERVATIVE FREE 10 ML: 5 INJECTION INTRAVENOUS at 20:53

## 2024-12-19 RX ADMIN — PHENYLEPHRINE HYDROCHLORIDE 80 MCG: 10 INJECTION INTRAVENOUS at 15:29

## 2024-12-19 RX ADMIN — CYCLOBENZAPRINE 10 MG: 10 TABLET, FILM COATED ORAL at 17:58

## 2024-12-19 RX ADMIN — ROCURONIUM BROMIDE 20 MG: 10 INJECTION INTRAVENOUS at 15:35

## 2024-12-19 RX ADMIN — WATER 2000 MG: 1 INJECTION INTRAMUSCULAR; INTRAVENOUS; SUBCUTANEOUS at 14:50

## 2024-12-19 RX ADMIN — DEXAMETHASONE SODIUM PHOSPHATE 8 MG: 4 INJECTION, SOLUTION INTRAMUSCULAR; INTRAVENOUS at 14:43

## 2024-12-19 RX ADMIN — OXYCODONE 5 MG: 5 TABLET ORAL at 20:49

## 2024-12-19 RX ADMIN — ROCURONIUM BROMIDE 25 MG: 10 INJECTION INTRAVENOUS at 14:40

## 2024-12-19 RX ADMIN — PROPOFOL 120 MG: 10 INJECTION, EMULSION INTRAVENOUS at 14:28

## 2024-12-19 RX ADMIN — GLYCOPYRROLATE 0.2 MG: 0.2 INJECTION INTRAMUSCULAR; INTRAVENOUS at 14:32

## 2024-12-19 RX ADMIN — FENTANYL CITRATE 50 MCG: 50 INJECTION, SOLUTION INTRAMUSCULAR; INTRAVENOUS at 15:27

## 2024-12-19 ASSESSMENT — PAIN SCALES - GENERAL
PAINLEVEL_OUTOF10: 3
PAINLEVEL_OUTOF10: 5

## 2024-12-19 ASSESSMENT — PAIN DESCRIPTION - ORIENTATION: ORIENTATION: POSTERIOR

## 2024-12-19 ASSESSMENT — PAIN DESCRIPTION - DESCRIPTORS
DESCRIPTORS: SORE
DESCRIPTORS: ACHING;SORE
DESCRIPTORS: ACHING
DESCRIPTORS: SORE

## 2024-12-19 ASSESSMENT — PAIN - FUNCTIONAL ASSESSMENT
PAIN_FUNCTIONAL_ASSESSMENT: PREVENTS OR INTERFERES SOME ACTIVE ACTIVITIES AND ADLS
PAIN_FUNCTIONAL_ASSESSMENT: 0-10

## 2024-12-19 ASSESSMENT — PAIN DESCRIPTION - ONSET: ONSET: OTHER (COMMENT)

## 2024-12-19 ASSESSMENT — PAIN DESCRIPTION - PAIN TYPE: TYPE: ACUTE PAIN

## 2024-12-19 ASSESSMENT — PAIN DESCRIPTION - LOCATION
LOCATION: NECK

## 2024-12-19 NOTE — FLOWSHEET NOTE
12/19/24 1747   OTHER   Reason for Delay in Patient Leaving PACU Nurse Not Available        12/19/24 180   Handoff   Communication Given Transfer Handoff   Handoff phase Phase I transferring   Handoff Given To Juana DONOVAN   Handoff Received From Keena RN   Handoff Communication Telephone      Bed: 31  Expected date:   Expected time:   Means of arrival:   Comments:  Triage

## 2024-12-19 NOTE — PERIOP NOTE
1142:  Patient ambulatory to OR Holding area.  He is very [place] but alert, oriented & very pleasant.  He reports having hearing aides at home that he does not use.      Consents reviewed with him.  He verbalizes understanding of all info provided.  Signature obtained for anesthesia.    1209:  Patient up to BR to empty bladder & change into surgical gown.

## 2024-12-19 NOTE — FLOWSHEET NOTE
12/19/24 1715   Handoff   Communication Given Transfer Handoff   Handoff phase Phase I receiving   Handoff Given To Keena DONOVAN   Handoff Received From CHARLY Cantu RN and ORIN Alfred CRNA

## 2024-12-20 VITALS
RESPIRATION RATE: 16 BRPM | WEIGHT: 211.2 LBS | HEART RATE: 97 BPM | DIASTOLIC BLOOD PRESSURE: 68 MMHG | HEIGHT: 72 IN | OXYGEN SATURATION: 93 % | BODY MASS INDEX: 28.61 KG/M2 | SYSTOLIC BLOOD PRESSURE: 125 MMHG | TEMPERATURE: 97.5 F

## 2024-12-20 PROBLEM — M48.00 SPINAL STENOSIS: Status: ACTIVE | Noted: 2024-12-20

## 2024-12-20 PROCEDURE — 6360000002 HC RX W HCPCS: Performed by: NEUROLOGICAL SURGERY

## 2024-12-20 PROCEDURE — 2500000003 HC RX 250 WO HCPCS: Performed by: NEUROLOGICAL SURGERY

## 2024-12-20 PROCEDURE — 51798 US URINE CAPACITY MEASURE: CPT

## 2024-12-20 PROCEDURE — 6370000000 HC RX 637 (ALT 250 FOR IP): Performed by: NEUROLOGICAL SURGERY

## 2024-12-20 PROCEDURE — 97162 PT EVAL MOD COMPLEX 30 MIN: CPT

## 2024-12-20 PROCEDURE — 97116 GAIT TRAINING THERAPY: CPT

## 2024-12-20 PROCEDURE — 97535 SELF CARE MNGMENT TRAINING: CPT | Performed by: OCCUPATIONAL THERAPIST

## 2024-12-20 PROCEDURE — 1100000000 HC RM PRIVATE

## 2024-12-20 PROCEDURE — 97166 OT EVAL MOD COMPLEX 45 MIN: CPT | Performed by: OCCUPATIONAL THERAPIST

## 2024-12-20 PROCEDURE — G0378 HOSPITAL OBSERVATION PER HR: HCPCS

## 2024-12-20 PROCEDURE — 94761 N-INVAS EAR/PLS OXIMETRY MLT: CPT

## 2024-12-20 PROCEDURE — 2700000000 HC OXYGEN THERAPY PER DAY

## 2024-12-20 PROCEDURE — 97530 THERAPEUTIC ACTIVITIES: CPT

## 2024-12-20 PROCEDURE — 97530 THERAPEUTIC ACTIVITIES: CPT | Performed by: OCCUPATIONAL THERAPIST

## 2024-12-20 RX ORDER — CYCLOBENZAPRINE HCL 10 MG
10 TABLET ORAL EVERY 12 HOURS PRN
Qty: 20 TABLET | Refills: 0 | Status: SHIPPED | OUTPATIENT
Start: 2024-12-20 | End: 2024-12-30

## 2024-12-20 RX ORDER — SENNA AND DOCUSATE SODIUM 50; 8.6 MG/1; MG/1
1 TABLET, FILM COATED ORAL 2 TIMES DAILY
Qty: 20 TABLET | Refills: 0 | Status: SHIPPED | OUTPATIENT
Start: 2024-12-20

## 2024-12-20 RX ORDER — OXYCODONE HYDROCHLORIDE 5 MG/1
5 TABLET ORAL EVERY 4 HOURS PRN
Qty: 20 TABLET | Refills: 0 | Status: SHIPPED | OUTPATIENT
Start: 2024-12-20 | End: 2024-12-27

## 2024-12-20 RX ADMIN — POLYETHYLENE GLYCOL 3350 17 G: 17 POWDER, FOR SOLUTION ORAL at 08:00

## 2024-12-20 RX ADMIN — AMLODIPINE BESYLATE 10 MG: 5 TABLET ORAL at 08:01

## 2024-12-20 RX ADMIN — SENNOSIDES AND DOCUSATE SODIUM 1 TABLET: 50; 8.6 TABLET ORAL at 08:00

## 2024-12-20 RX ADMIN — ACYCLOVIR 400 MG: 200 CAPSULE ORAL at 07:59

## 2024-12-20 RX ADMIN — LOSARTAN POTASSIUM 100 MG: 100 TABLET, FILM COATED ORAL at 08:01

## 2024-12-20 RX ADMIN — ACETAMINOPHEN 650 MG: 325 TABLET ORAL at 12:57

## 2024-12-20 RX ADMIN — HYDROCHLOROTHIAZIDE 25 MG: 25 TABLET ORAL at 08:01

## 2024-12-20 RX ADMIN — SODIUM CHLORIDE, PRESERVATIVE FREE 10 ML: 5 INJECTION INTRAVENOUS at 08:01

## 2024-12-20 RX ADMIN — OXYCODONE 5 MG: 5 TABLET ORAL at 05:05

## 2024-12-20 RX ADMIN — OXYCODONE 10 MG: 5 TABLET ORAL at 12:58

## 2024-12-20 RX ADMIN — ACETAMINOPHEN 650 MG: 325 TABLET ORAL at 08:00

## 2024-12-20 RX ADMIN — SERTRALINE 25 MG: 25 TABLET, FILM COATED ORAL at 08:00

## 2024-12-20 RX ADMIN — OXYCODONE HYDROCHLORIDE AND ACETAMINOPHEN 1000 MG: 500 TABLET ORAL at 08:00

## 2024-12-20 RX ADMIN — ACETAMINOPHEN 650 MG: 325 TABLET ORAL at 02:06

## 2024-12-20 RX ADMIN — METFORMIN HYDROCHLORIDE 500 MG: 500 TABLET ORAL at 08:01

## 2024-12-20 RX ADMIN — SODIUM CHLORIDE, PRESERVATIVE FREE 10 ML: 5 INJECTION INTRAVENOUS at 06:28

## 2024-12-20 RX ADMIN — WATER 2000 MG: 1 INJECTION INTRAMUSCULAR; INTRAVENOUS; SUBCUTANEOUS at 06:32

## 2024-12-20 ASSESSMENT — PAIN DESCRIPTION - LOCATION
LOCATION: NECK

## 2024-12-20 ASSESSMENT — PAIN DESCRIPTION - FREQUENCY
FREQUENCY: INTERMITTENT

## 2024-12-20 ASSESSMENT — PAIN DESCRIPTION - PAIN TYPE
TYPE: ACUTE PAIN

## 2024-12-20 ASSESSMENT — PAIN SCALES - GENERAL
PAINLEVEL_OUTOF10: 2
PAINLEVEL_OUTOF10: 3
PAINLEVEL_OUTOF10: 7

## 2024-12-20 ASSESSMENT — PAIN DESCRIPTION - ONSET
ONSET: SUDDEN

## 2024-12-20 ASSESSMENT — PAIN DESCRIPTION - DESCRIPTORS
DESCRIPTORS: ACHING

## 2024-12-20 ASSESSMENT — PAIN DESCRIPTION - ORIENTATION
ORIENTATION: POSTERIOR

## 2024-12-20 ASSESSMENT — PAIN - FUNCTIONAL ASSESSMENT
PAIN_FUNCTIONAL_ASSESSMENT: PREVENTS OR INTERFERES SOME ACTIVE ACTIVITIES AND ADLS

## 2024-12-20 NOTE — OP NOTE
[facility]              [address] [place]  [address]                            OPERATIVE REPORT      PATIENT NAME: NISH NORTON            : 1950  MED REC NO: [id number]                       ROOM: 107  ACCOUNT NO: [id number]                       ADMIT DATE: 2024  PROVIDER: Pio Gar MD    DATE OF SERVICE:  2024    PREOPERATIVE DIAGNOSES:  Cervical stenosis with myelopathy.    POSTOPERATIVE DIAGNOSES:  Cervical stenosis with myelopathy.    PROCEDURES PERFORMED:  Posterior cervical laminectomy and segmental instrumented fusion, C3 through C6; arthrodesis, C3-4, C4-5, C5-6 with local bone and synthetic bone graft.    SURGEON:  Pio Gar MD    ASSISTANT:  \[facility]\"".    ANESTHESIA:  General.    ESTIMATED BLOOD LOSS:  Minimal.    SPECIMENS REMOVED:  None.    INTRAOPERATIVE FINDINGS:  See body of report.     COMPLICATIONS:  None.    IMPLANTS:  C-spine lateral mass screws and rods, and DBM putty, synthetic bone graft.    INDICATIONS:  Cervical stenosis with myelopathy.    DESCRIPTION OF PROCEDURE:  After discussing the risks, benefits, and alternatives of surgery with him and reviewing the imaging studies in this patient who had already undergone an anterior cervical fusion from C4 through C7, he agreed to proceed with surgery.  He had signs of myelopathy that did not improve with conservative measures and were getting worse over time.  He was taken to the operating room.  He was induced under general endotracheal anesthesia and placed on the operating table in a prone position on chest rolls.  The head supported in a 3-point Kwok head chau.  The posterior cervical region was then scrubbed, prepped, and draped in usual sterile fashion.  A time-out was performed to identify the correct patient and procedure.  Appropriate antibiotics administered and sequential stockings applied for DVT prophylaxis.  After time-out was

## 2024-12-20 NOTE — PROGRESS NOTES
PHYSICAL THERAPY EVALUATION/DISCHARGE    Patient: Misael Calero (74 y.o. 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 States he still has numbness in B hands/fingers. R ankle AROM appears to be WNL but during MMT testing DF muscle appears to be weak. Cuing provided to perform log roll technique for bed mobility and proper hand placement during sit<>stand transfers. Gait pattern presented like patient has numbness in B feet, however patient stated sensation was fine. Also performs increased hip/knee flexion on R to clear foot due to decreased strength in R ankle DF. Ambulated up/down 4 steps using BUE on R rail and step-to pattern. Educated patient on importance of turning sideways to have BUE support on rail to prevent twisting. No report of dizziness during mobility, BP remained stable. Patient cleared therapy for discharge. At end of session patient was left sitting up in chair with chair alarm activated, call bell within reach, no complaints.     Functional Outcome Measure:  The patient scored 16/28 on the Tinetti outcome measure which is indicative of high fall risk.          Further skilled acute physical therapy is not indicated at this time.       PLAN :  Recommendation for discharge: (in order for the patient to meet his/her long term goals):   Intermittent physical therapy up to 2-3x/week in previous living setting with additional support needed for ambulation    Other factors to  Score Risk of Falls  <19 = High Fall Risk  19-24 = Moderate Fall Risk  25-28 = Low Fall Risk  Olya MARCUS. Performance-Oriented Assessment of Mobility Problems in Elderly Patients. JAGS 1986; 34:119-126. (Scoring Description: PT Bulletin Feb. 10, 1993)    Older adults: (Ko et al, 2009; n = 1000 [place] elderly evaluated with ABC, MEDARDO, ADL, and IADL)  · Mean MEDARDO score for males aged 65-79 years = 26.21(3.40)  · Mean MEDARDO score for females age 65-79 years = 25.16(4.30)  · Mean MEDARDO score for males over 80 years = 23.29(6.02)  · Mean MEDARDO score for females over 80 years = 17.20(8.32)                                                                                                                                                                                                                               Pain Ratin/10   Pain Intervention(s):   patient medicated for pain prior to session    Activity Tolerance:   Good    After treatment:   Patient left in no apparent distress sitting up in chair, Call bell within reach, and Bed/ chair alarm activated      COMMUNICATION/EDUCATION:   The patient's plan of care was discussed with: registered nurse    Patient Education  Education Given To: Patient  Education Provided: Role of Therapy;Plan of Care;Precautions;Transfer Training  Education Method: Verbal  Barriers to Learning: None  Education Outcome: Verbalized understanding    Thank you for this referral.  Jenna Murray, PT  Minutes: 35      Physical Therapy Evaluation Charge Determination   History Examination Presentation Decision-Making   MEDIUM  Complexity : 1-2 comorbidities / personal factors will impact the outcome/ POC  MEDIUM Complexity : 3 Standardized tests and measures addressin body structure, function, activity limitation and / or participation in recreation  MEDIUM Complexity : Evolving with changing characteristics  Tinetti Gait and Balance  HIGH      Based on the above components, the patient evaluation

## 2024-12-20 NOTE — ANESTHESIA POSTPROCEDURE EVALUATION
Department of Anesthesiology  Postprocedure Note    Patient: Misael Calero  MRN: [id number]  YOB: 1950  Date of evaluation: 12/20/2024    Procedure Summary       Date: 12/19/24 Room / Location: [facility] MAIN OR  / [facility] MAIN OR    Anesthesia Start: 1421 Anesthesia Stop: 1717    Procedure: POSTERIOR CERVICAL LAMINECTOMY AND FUSION WITH INSTRUMENTATION C3-C6 (Neck) Diagnosis:       Cervical spinal stenosis      (Cervical spinal stenosis [M48.02])    Providers: Pio Gar MD Responsible Provider: Ayad Rivera MD    Anesthesia Type: General ASA Status: 3            Anesthesia Type: General    Mark Phase I: Mark Score: 8    Mark Phase II:      Anesthesia Post Evaluation    Patient location during evaluation: PACU  Patient participation: complete - patient participated  Level of consciousness: sleepy but conscious and responsive to verbal stimuli  Airway patency: patent  Nausea & Vomiting: no vomiting and no nausea  Cardiovascular status: blood pressure returned to baseline and hemodynamically stable  Respiratory status: acceptable  Hydration status: stable  Pain management: adequate    No notable events documented.

## 2024-12-20 NOTE — PROGRESS NOTES
testing is not needed.  5. Usually the patient's performance over the preceding 24-48 hours is important, but occasionally longer periods will be relevant.  6. Middle categories imply that the patient supplies over 50 per cent of the effort.  7. Use of aids to be independent is allowed.    Score Interpretation (from [place] )    Independent   60-79 Minimally independent   40-59 Partially dependent   20-39 Very dependent   <20 Totally dependent     -Herminio May., Barthel, D.W. (1965). Functional evaluation: the Barthel Index. Md State Med J (142.  -AUDREY Nieto, ORIN Humphries (). The Barthel activities of daily living index: self-reporting versus actual performance in the old (> or = 75 years). Journal of American Geriatric Society 45(7), 832-836.   -ADAMS Bo, DEREK Paz., Carlos, OSEAS., Abdias, A.LAURIE. (1999). Measuring the change in disability after inpatient rehabilitation; comparison of the responsiveness of the Barthel Index and Functional [place] Measure. Journal of Neurology, Neurosurgery, and Psychiatry, 66(4), 480-484.  -Antonio Garcia, N.J.A, Melany Aaron,  RUT.BRIDGETTE, & Lexy, M.A. (2004) Assessment of post-stroke quality of life in cost-effectiveness studies: The usefulness of the Barthel Index and the EuroQoL-5D. Quality of Life Research, 13, 427-43                                                                                                                                                                                                                                 Pain Ratin/10   Pain Intervention(s):   nursing notified and addressing and repositioning    Activity Tolerance:   Fair     After treatment:   Patient left in no apparent distress sitting up in chair, Call bell within reach, Bed/ chair alarm activated, and nurse present    COMMUNICATION/EDUCATION:   The patient's plan of care was discussed with: physical therapist and registered nurse    Patient

## 2024-12-20 NOTE — CARE COORDINATION
1014 - Notified by therapy that pt will need RW for home use; PT further stated that pt would benefit from intermittent therapy visits in previous living situation. Met with pt in room, pt seated in recliner, to discuss recommendations for post-acute needs. Pt verbalized understanding and agreement with RW, declined home health. CM advised pt that if pt changes his mind and would like HH visits to call PCP or neurosurgeon office to have them set up, pt verbalized understanding. Referral sent to [facility] for review, if approved RW will be delivered to bedside.    Initial note - CM acknowledged receipt of consult to assist with home health or other discharge needs. Chart review completed, pt has had surgery, therapy evaluations are pending. CM following up with care team for recommendations re potential discharge needs and will discuss with pt as appropriate.    Desirae Johnson, [facility]  Care Management  [phone]

## 2024-12-20 NOTE — PROGRESS NOTES
Attempted to deliver and verbally explain the VOON/MOON with patient. Patient was MAGED. Delmy Rachel, Care Management Assistant

## 2024-12-20 NOTE — PLAN OF CARE
Problem: Chronic Conditions and Co-morbidities  Goal: Patient's chronic conditions and co-morbidity symptoms are monitored and maintained or improved  Outcome: Progressing  Flowsheets (Taken 12/19/2024 2054 by Leslie Fields, RN)  Care Plan - Patient's Chronic Conditions and Co-Morbidity Symptoms are Monitored and Maintained or Improved:   Monitor and assess patient's chronic conditions and comorbid symptoms for stability, deterioration, or improvement   Collaborate with multidisciplinary team to address chronic and comorbid conditions and prevent exacerbation or deterioration     Problem: Safety - Adult  Goal: Free from fall injury  Outcome: Progressing  Flowsheets (Taken 12/19/2024 2114 by Leslie Fields, RN)  Free From Fall Injury: Instruct family/caregiver on patient safety     Problem: ABCDS Injury Assessment  Goal: Absence of physical injury  Outcome: Progressing  Flowsheets (Taken 12/19/2024 2114 by Leslie Fields, RN)  Absence of Physical Injury: Implement safety measures based on patient assessment     Problem: Pain  Goal: Verbalizes/displays adequate comfort level or baseline comfort level  Outcome: Progressing  Flowsheets (Taken 12/20/2024 0505 by Leslie Fields, RN)  Verbalizes/displays adequate comfort level or baseline comfort level:   Encourage patient to monitor pain and request assistance   Assess pain using appropriate pain scale   Administer analgesics based on type and severity of pain and evaluate response   Implement non-pharmacological measures as appropriate and evaluate response

## 2024-12-20 NOTE — PROGRESS NOTES
Ortho / Neurosurgery Progress Note    POD# 1  s/p POSTERIOR CERVICAL LAMINECTOMY AND FUSION WITH INSTRUMENTATION C3-C6   Pt seen with no visitor at bedside, pt is awake and alert, doing well reports ni pain, pre-op numbness is upper extremities is much better. He is voiding and tolerating diet.    Patient in bed    VSS Afebrile.    Visit Vitals  /75   Pulse 80   Temp 97.3 °F (36.3 °C) (Oral)   Resp 16   Ht 1.829 m (6')   Wt 95.8 kg (211 lb 3.2 oz)   SpO2 92%   BMI 28.64 kg/m²       Voiding status: Voiding independently.            Labs    Lab Results   Component Value Date/Time    HGB 11.3 12/11/2024 09:15 AM      Lab Results   Component Value Date/Time    INR 1.0 12/11/2024 09:15 AM      Lab Results   Component Value Date/Time     12/11/2024 09:15 AM    K 3.8 12/11/2024 09:15 AM     12/11/2024 09:15 AM    CO2 26 12/11/2024 09:15 AM    BUN 26 12/11/2024 09:15 AM     Recent Glucose Results:   Glucose   Date Value Ref Range Status   12/11/2024 114 (H) 65 - 100 mg/dL Final   02/28/2024 112 (H) 65 - 100 mg/dL Final   10/05/2023 107 (H) 70 - 99 mg/dL Final   12/28/2022 97 70 - 99 mg/dL Final           Body mass index is 28.64 kg/m². : A BMI > 30 is classified as obesity and > 40 is classified as morbid obesity.     Awake and alert. No acute distress.    Dressing: Optiform C.D.I. HVAC in place.  No significant erythema or swelling  Cryotherapy in place over incision.   BUE sensation to light touch intact  BUE motor intact. Strength 5/5    SCD for mechanical DVT proph while in bed        PLAN:  1) PT BID - WBAT, foam collar for comfort.   3) GI Prophylaxis - pepcid  4) Pain control - scheduled tylenol  , and prn  oxycodone    5) Readiness for discharge:     [x] Vital Signs stable    [x] Labs stable    [x] + Voiding    [x] Wound intact, drainage minimal    [x] Tolerating PO intake     [] Cleared by PT (OT if applicable) for discharge   [x] Adequate pain control on oral medication alone        Discharge  Plan: Home after PT              Jenna Boland PA-C, [facility]    Orthopedic Physician Assistant

## 2024-12-20 NOTE — PROGRESS NOTES
Jenna AGUIRRE notified that pt able to void 200 ml and post void bladder scanner showed 220 ml of retention, order given okay to discharge today.

## 2024-12-20 NOTE — PLAN OF CARE
Problem: Chronic Conditions and Co-morbidities  Goal: Patient's chronic conditions and co-morbidity symptoms are monitored and maintained or improved  12/20/2024 1130 by Moriah Montano RN  Outcome: Adequate for Discharge  12/20/2024 0943 by Moriah Montano RN  Outcome: Progressing  Flowsheets (Taken 12/19/2024 2054 by Leslie Fields RN)  Care Plan - Patient's Chronic Conditions and Co-Morbidity Symptoms are Monitored and Maintained or Improved:   Monitor and assess patient's chronic conditions and comorbid symptoms for stability, deterioration, or improvement   Collaborate with multidisciplinary team to address chronic and comorbid conditions and prevent exacerbation or deterioration     Problem: Safety - Adult  Goal: Free from fall injury  12/20/2024 1130 by Moriah Montano RN  Outcome: Adequate for Discharge  12/20/2024 0943 by Moriah Montano RN  Outcome: Progressing  Flowsheets (Taken 12/19/2024 2114 by Leslie Fields RN)  Free From Fall Injury: Instruct family/caregiver on patient safety     Problem: ABCDS Injury Assessment  Goal: Absence of physical injury  12/20/2024 1130 by Moriah Montano RN  Outcome: Adequate for Discharge  12/20/2024 0943 by Moriah Montano RN  Outcome: Progressing  Flowsheets (Taken 12/19/2024 2114 by Leslie Fields RN)  Absence of Physical Injury: Implement safety measures based on patient assessment     Problem: Pain  Goal: Verbalizes/displays adequate comfort level or baseline comfort level  12/20/2024 1130 by Moriah Montano RN  Outcome: Adequate for Discharge  12/20/2024 0943 by Moriah Montano RN  Outcome: Progressing  Flowsheets (Taken 12/20/2024 0505 by Leslie Fields RN)  Verbalizes/displays adequate comfort level or baseline comfort level:   Encourage patient to monitor pain and request assistance   Assess pain using appropriate pain scale   Administer analgesics based on type and severity of pain and evaluate response   Implement  non-pharmacological measures as appropriate and evaluate response

## 2024-12-20 NOTE — PROGRESS NOTES
DISCHARGE NOTE FROM ORTHO NURSE    Patient determined to be stable for discharge by attending provider. I have reviewed the discharge instructions with the patient. They verbalized understanding and all questions were answered to their satisfaction. No complaints or further questions were expressed.      Medications sent to pharmacy. Appropriate educational materials and medication side effect teaching were provided.      PIV were removed prior to discharge.     Patient did not discharge with any line, boswell, or drain.    Personal items and valuables accounted for at discharge by patient and/or family: Yes    Post-op patient: Yes-Patient given post-op discharge kit and instructed on use.    Moriah Montano, RN

## 2024-12-20 NOTE — DISCHARGE SUMMARY
Glucosamine 500 MG CAPS Past Week  Yes Yes   Sig: Take 500 mg by mouth 3 times daily   Multiple Vitamin (MULTI VITAMIN DAILY PO) Past Week  Yes Yes   Sig: Take 1 tablet by mouth daily   Vitamin E 268 MG (400 UNIT) CAPS Past Week  Yes Yes   Sig: Take 400 Units by mouth daily   acyclovir (ZOVIRAX) 200 MG capsule 12/19/2024 at 0130  No Yes   Sig: TAKE 2 CAPSULES BY MOUTH DAILY   amLODIPine (NORVASC) 10 MG tablet 12/19/2024 at 0130  No Yes   Sig: TAKE 1 TABLET BY MOUTH DAILY   aspirin 81 MG chewable tablet 12/14/2024  Yes No   Sig: Take 1 tablet by mouth as needed   atorvastatin (LIPITOR) 80 MG tablet 12/19/2024 at 0130  No Yes   Sig: TAKE 1 TABLET BY MOUTH EVERY NIGHT   b complex vitamins capsule Past Week  Yes Yes   Sig: Take 1 capsule by mouth daily   gabapentin (NEURONTIN) 300 MG capsule 12/19/2024 at 0130  No Yes   Sig: Take 1 capsule by mouth in the morning and at bedtime for 60 days. Max Daily Amount: 600 mg   losartan-hydroCHLOROthiazide (HYZAAR) 100-25 MG per tablet 12/19/2024 at 0130  No Yes   Sig: Take 1 tablet by mouth daily   metFORMIN (GLUCOPHAGE) 500 MG tablet 12/18/2024  No Yes   Sig: TAKE 1 TABLET BY MOUTH DAILY   naproxen (NAPROSYN) 500 MG tablet 12/14/2024  Yes No   Sig: Take 1 tablet by mouth 2 times daily (with meals)   sertraline (ZOLOFT) 25 MG tablet 12/19/2024 at 0130  No Yes   Sig: Take 1 tablet by mouth daily   tadalafil (CIALIS) 5 MG tablet Past Week  Yes Yes   Sig: Take 1 tablet by mouth daily      Facility-Administered Medications: None        Allergies:  No Known Allergies     Hospital Course:  The patient underwent surgery.  Complications:  None; patient tolerated the procedure well. Was taken to the PACU in stable condition and then transferred to the ortho floor.      Perioperative Antibiotics:  Ancef     Postoperative Pain Management:  Oxycodone    Postoperative transfusions:    Number of units banked PRBCs =   none     Post Op complications: none    Hemoglobin at discharge:    Lab  Results   Component Value Date/Time    HGB 11.3 (L) 12/11/2024 09:15 AM    INR 1.0 12/11/2024 09:15 AM        Incision - clean, dry and intact. No significant erythema or swelling. Wound appears to be healing without any evidence of infection.  Neurovascular exam found to be within normal limits.    Physical Therapy started following surgery and participated in bed mobility, transfers and ambulation.              Discharged to: Rehab.    Condition on Discharge:   Good    Discharge instructions:  - Take pain medications as prescribed  - Resume pre hospital diet      - Discharge activity: activity as tolerated  - Ambulate as tolerated  - Avoid bending, lifting and twisting  - Foam collar for comfort.  - Wound Care Keep wound clean and dry.  See discharge instruction sheet.            -DISCHARGE MEDICATION LIST        Medication List        START taking these medications      cyclobenzaprine 10 MG tablet  Commonly known as: FLEXERIL  Take 1 tablet by mouth every 12 hours as needed for Muscle spasms     oxyCODONE 5 MG immediate release tablet  Commonly known as: ROXICODONE  Take 1 tablet by mouth every 4 hours as needed for Pain for up to 7 days. Max Daily Amount: 30 mg     sennosides-docusate sodium 8.6-50 MG tablet  Commonly known as: SENOKOT-S  Take 1 tablet by mouth 2 times daily            CONTINUE taking these medications      acyclovir 200 MG capsule  Commonly known as: ZOVIRAX  TAKE 2 CAPSULES BY MOUTH DAILY     amLODIPine 10 MG tablet  Commonly known as: NORVASC  TAKE 1 TABLET BY MOUTH DAILY     aspirin 81 MG chewable tablet     atorvastatin 80 MG tablet  Commonly known as: LIPITOR  TAKE 1 TABLET BY MOUTH EVERY NIGHT     b complex vitamins capsule     Chondroitin Sulfate Complex 400-60-2.5 MG Caps  Generic drug: Chondroitin Sulfate-Vit C-Mn     gabapentin 300 MG capsule  Commonly known as: NEURONTIN  Take 1 capsule by mouth in the morning and at bedtime for 60 days. Max Daily Amount: 600 mg     Glucosamine 500

## 2024-12-20 NOTE — PROGRESS NOTES
End of Shift Note    Bedside shift change report given to Moriah DONOVAN (oncoming nurse) by MJ VALLES RN (offgoing nurse).  Report included the following information SBAR, Kardex, OR Summary, Intake/Output, and MAR    Shift worked:  7p-7a     Shift summary and any significant changes:     Slept well.  Weems estiven this am at 5am  Drain in place   Soft collar in place  Oxycodone for pain with reported decreased pain  O 2 overnight      Concerns for physician to address:       Zone phone for oncoming shift:          Activity:  Level of Assistance: Minimal assist, patient does 75% or more  Number times ambulated in hallways past shift: 0  Number of times OOB to chair past shift: 0    Cardiac:   Cardiac Monitoring: No      Cardiac Rhythm: Sinus rhythm    Access:  Current line(s): PIV     Genitourinary:   Urinary Status: Weems    Respiratory:   O2 Device: Nasal cannula  Chronic home O2 use?: NO  Incentive spirometer at bedside: YES    GI:  Last BM (including prior to admit): 12/18/24  Current diet:  ADULT DIET; Regular  Passing flatus: YES    Pain Management:   Patient states pain is manageable on current regimen: YES    Skin:  Derian Scale Score: 20  Interventions: Wound Offloading (Prevention Methods): Bed, pressure reduction mattress, Pillows, Repositioning, Turning, Elevate heels    Patient Safety:  Fall Risk: Nursing Judgement-Fall Risk High(Add Comments): Yes  Fall Risk Interventions  Nursing Judgement-Fall Risk High(Add Comments): Yes  Toilet Every 2 Hours-In Advance of Need: Yes  Hourly Visual Checks: Eyes closed, In bed  Fall Visual Posted: Armband, Socks, Fall sign posted  Room Door Open: Yes  Alarm On: Bed  Patient Moved Closer to Nursing Station: No    Active Consults:   IP CONSULT TO CASE MANAGEMENT    Length of Stay:  Expected LOS: 2  Actual LOS: 0    MJ VALLES, RN

## 2024-12-23 ENCOUNTER — TELEPHONE (OUTPATIENT)
Dept: PRIMARY CARE CLINIC | Facility: CLINIC | Age: 74
End: 2024-12-23

## 2024-12-23 NOTE — TELEPHONE ENCOUNTER
Care Transitions Initial Follow Up Call    Outreach made within 2 business days of discharge: Yes    Patient: Misael Calero Patient : 1950   MRN: [id number]  Reason for Admission: Myelopathy   Discharge Date: 24       Spoke with: Misael Calero    Discharge department/facility: [facility]    TCM Interactive Patient Contact:  Was patient able to fill all prescriptions: Yes  Was patient instructed to bring all medications to the follow-up visit: Yes  Is patient taking all medications as directed in the discharge summary? Yes  Does patient understand their discharge instructions: Yes  Does patient have questions or concerns that need addressed prior to 7-14 day follow up office visit: no    Additional needs identified to be addressed with provider  No needs identified             Scheduled appointment with PCP within 7-14 days    Follow Up  Future Appointments   Date Time Provider Department Center   3/5/2025  9:00 AM Roxana Mancia, APRN - CNP [facility] DEP       Corinne Riley LPN

## 2025-01-04 DIAGNOSIS — E78.2 MIXED HYPERLIPIDEMIA: ICD-10-CM

## 2025-01-04 DIAGNOSIS — I10 ESSENTIAL (PRIMARY) HYPERTENSION: ICD-10-CM

## 2025-01-06 RX ORDER — LOSARTAN POTASSIUM AND HYDROCHLOROTHIAZIDE 25; 100 MG/1; MG/1
1 TABLET ORAL DAILY
Qty: 90 TABLET | Refills: 1 | Status: SHIPPED | OUTPATIENT
Start: 2025-01-06

## 2025-01-06 RX ORDER — ATORVASTATIN CALCIUM 80 MG/1
80 TABLET, FILM COATED ORAL NIGHTLY
Qty: 90 TABLET | Refills: 1 | Status: SHIPPED | OUTPATIENT
Start: 2025-01-06

## 2025-03-05 ENCOUNTER — OFFICE VISIT (OUTPATIENT)
Dept: PRIMARY CARE CLINIC | Facility: CLINIC | Age: 75
End: 2025-03-05

## 2025-03-05 VITALS
BODY MASS INDEX: 28.53 KG/M2 | SYSTOLIC BLOOD PRESSURE: 116 MMHG | TEMPERATURE: 97.8 F | HEIGHT: 72 IN | WEIGHT: 210.6 LBS | DIASTOLIC BLOOD PRESSURE: 65 MMHG | OXYGEN SATURATION: 97 % | HEART RATE: 72 BPM

## 2025-03-05 DIAGNOSIS — E11.9 TYPE 2 DIABETES MELLITUS WITHOUT COMPLICATION, WITHOUT LONG-TERM CURRENT USE OF INSULIN (HCC): Primary | ICD-10-CM

## 2025-03-05 DIAGNOSIS — G99.2 MYELOPATHY CONCURRENT WITH AND DUE TO SPINAL STENOSIS OF CERVICAL REGION (HCC): ICD-10-CM

## 2025-03-05 DIAGNOSIS — I10 ESSENTIAL (PRIMARY) HYPERTENSION: ICD-10-CM

## 2025-03-05 DIAGNOSIS — F41.1 GAD (GENERALIZED ANXIETY DISORDER): ICD-10-CM

## 2025-03-05 DIAGNOSIS — E78.2 MIXED HYPERLIPIDEMIA: ICD-10-CM

## 2025-03-05 DIAGNOSIS — M48.02 MYELOPATHY CONCURRENT WITH AND DUE TO SPINAL STENOSIS OF CERVICAL REGION (HCC): ICD-10-CM

## 2025-03-05 SDOH — ECONOMIC STABILITY: FOOD INSECURITY: WITHIN THE PAST 12 MONTHS, THE FOOD YOU BOUGHT JUST DIDN'T LAST AND YOU DIDN'T HAVE MONEY TO GET MORE.: PATIENT DECLINED

## 2025-03-05 SDOH — ECONOMIC STABILITY: FOOD INSECURITY: WITHIN THE PAST 12 MONTHS, YOU WORRIED THAT YOUR FOOD WOULD RUN OUT BEFORE YOU GOT MONEY TO BUY MORE.: PATIENT DECLINED

## 2025-03-05 ASSESSMENT — PATIENT HEALTH QUESTIONNAIRE - PHQ9
SUM OF ALL RESPONSES TO PHQ QUESTIONS 1-9: 0
1. LITTLE INTEREST OR PLEASURE IN DOING THINGS: NOT AT ALL
SUM OF ALL RESPONSES TO PHQ QUESTIONS 1-9: 0
2. FEELING DOWN, DEPRESSED OR HOPELESS: NOT AT ALL

## 2025-03-05 ASSESSMENT — ENCOUNTER SYMPTOMS: SHORTNESS OF BREATH: 0

## 2025-03-05 NOTE — PROGRESS NOTES
Misael Calero is a 74 y.o. male presents for    Chief Complaint   Patient presents with    Follow-up     3 mos follow up. Patient had neck surgery on 12/2024. May need blood work.     .    ASSESSMENT and PLAN  Misael was seen today for follow-up.    Diagnoses and all orders for this visit:    Type 2 diabetes mellitus without complication, without long-term current use of insulin (HCC)  Comments:  well controlled  recheck a1c.  Orders:  -     Hemoglobin A1C    Essential (primary) hypertension  Comments:  BP is well controlled.    Mixed hyperlipidemia  Comments:  recheck lipids. on high dose statin  Orders:  -     CBC  -     Comprehensive Metabolic Panel  -     Lipid Panel    DARLYN (generalized anxiety disorder)  Comments:  well controlled on sertraline.    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 Following with neurosurgery. He feels well.    Other orders  -     Cancel: PSA Screening             HISTORY OF PRESENT ILLNESS  Misael Calero is a 74 y.o. male presents for    Chief Complaint   Patient presents with    Follow-up     3 mos follow up. Patient had neck surgery on 12/2024. May need blood work.     .    Patient had surgery in Dec 2024. He is seeing neurosurgery. He is doing much better. Records reviewed    HTN: Blood pressure is well controlled on current medications. Compliant with medications.     Type 2 DM: A1c has been well controlled on metformin.   Hemoglobin A1C   Date Value Ref Range Status   12/11/2024 6.2 (H) 4.0 - 5.6 % Final     Comment:     (NOTE)  HbA1C Interpretive Ranges  <5.7              Normal  5.7 - 6.4         Consider Prediabetes  >6.5              Consider Diabetes         DARLYN: patient reports his anxiety is well controlled on the sertraline 25mg. States he feels this works great for him.      Vitals:    03/05/25 0922   BP: 116/65   Site: Left Upper Arm   Position: Sitting   Cuff Size: Medium Adult   Pulse: 72   Temp:

## 2025-03-06 LAB
ALBUMIN SERPL-MCNC: 4.5 G/DL (ref 3.8–4.8)
ALP SERPL-CCNC: 122 IU/L (ref 44–121)
ALT SERPL-CCNC: 10 IU/L (ref 0–44)
AST SERPL-CCNC: 13 IU/L (ref 0–40)
BILIRUB SERPL-MCNC: 0.3 MG/DL (ref 0–1.2)
BUN SERPL-MCNC: 17 MG/DL (ref 8–27)
BUN/CREAT SERPL: 20 (ref 10–24)
CALCIUM SERPL-MCNC: 9.6 MG/DL (ref 8.6–10.2)
CHLORIDE SERPL-SCNC: 104 MMOL/L (ref 96–106)
CHOLEST SERPL-MCNC: 132 MG/DL (ref 100–199)
CO2 SERPL-SCNC: 20 MMOL/L (ref 20–29)
CREAT SERPL-MCNC: 0.85 MG/DL (ref 0.76–1.27)
EGFRCR SERPLBLD CKD-EPI 2021: 90 ML/MIN/1.73
ERYTHROCYTE [DISTWIDTH] IN BLOOD BY AUTOMATED COUNT: 15.6 % (ref 11.6–15.4)
GLOBULIN SER CALC-MCNC: 2.5 G/DL (ref 1.5–4.5)
GLUCOSE SERPL-MCNC: 105 MG/DL (ref 70–99)
HBA1C MFR BLD: 6.2 % (ref 4.8–5.6)
HCT VFR BLD AUTO: 37.4 % (ref 37.5–51)
HDLC SERPL-MCNC: 41 MG/DL
HGB BLD-MCNC: 11.6 G/DL (ref 13–17.7)
LDLC SERPL CALC-MCNC: 68 MG/DL (ref 0–99)
MCH RBC QN AUTO: 25.6 PG (ref 26.6–33)
MCHC RBC AUTO-ENTMCNC: 31 G/DL (ref 31.5–35.7)
MCV RBC AUTO: 82 FL (ref 79–97)
PLATELET # BLD AUTO: 420 X10E3/UL (ref 150–450)
POTASSIUM SERPL-SCNC: 4.3 MMOL/L (ref 3.5–5.2)
PROT SERPL-MCNC: 7 G/DL (ref 6–8.5)
PSA SERPL-MCNC: 1.4 NG/ML (ref 0–4)
RBC # BLD AUTO: 4.54 X10E6/UL (ref 4.14–5.8)
SODIUM SERPL-SCNC: 141 MMOL/L (ref 134–144)
TRIGL SERPL-MCNC: 132 MG/DL (ref 0–149)
VLDLC SERPL CALC-MCNC: 23 MG/DL (ref 5–40)
WBC # BLD AUTO: 9 X10E3/UL (ref 3.4–10.8)

## 2025-03-22 DIAGNOSIS — F41.1 GAD (GENERALIZED ANXIETY DISORDER): ICD-10-CM

## 2025-03-24 RX ORDER — SERTRALINE HYDROCHLORIDE 25 MG/1
25 TABLET, FILM COATED ORAL DAILY
Qty: 90 TABLET | Refills: 1 | Status: SHIPPED | OUTPATIENT
Start: 2025-03-24

## 2025-05-02 DIAGNOSIS — I10 ESSENTIAL (PRIMARY) HYPERTENSION: ICD-10-CM

## 2025-05-02 DIAGNOSIS — E11.9 TYPE 2 DIABETES MELLITUS WITHOUT COMPLICATION, WITHOUT LONG-TERM CURRENT USE OF INSULIN (HCC): ICD-10-CM

## 2025-05-02 RX ORDER — AMLODIPINE BESYLATE 10 MG/1
10 TABLET ORAL DAILY
Qty: 90 TABLET | Refills: 1 | Status: SHIPPED | OUTPATIENT
Start: 2025-05-02

## 2025-06-09 ENCOUNTER — OFFICE VISIT (OUTPATIENT)
Dept: PRIMARY CARE CLINIC | Facility: CLINIC | Age: 75
End: 2025-06-09
Payer: MEDICARE

## 2025-06-09 VITALS
BODY MASS INDEX: 29.57 KG/M2 | SYSTOLIC BLOOD PRESSURE: 113 MMHG | WEIGHT: 218 LBS | TEMPERATURE: 97.5 F | DIASTOLIC BLOOD PRESSURE: 62 MMHG | HEART RATE: 72 BPM | OXYGEN SATURATION: 93 %

## 2025-06-09 DIAGNOSIS — M48.02 MYELOPATHY CONCURRENT WITH AND DUE TO SPINAL STENOSIS OF CERVICAL REGION (HCC): ICD-10-CM

## 2025-06-09 DIAGNOSIS — G99.2 MYELOPATHY CONCURRENT WITH AND DUE TO SPINAL STENOSIS OF CERVICAL REGION (HCC): ICD-10-CM

## 2025-06-09 DIAGNOSIS — J32.9 SINUSITIS, UNSPECIFIED CHRONICITY, UNSPECIFIED LOCATION: Primary | ICD-10-CM

## 2025-06-09 PROCEDURE — 1036F TOBACCO NON-USER: CPT | Performed by: STUDENT IN AN ORGANIZED HEALTH CARE EDUCATION/TRAINING PROGRAM

## 2025-06-09 PROCEDURE — 3074F SYST BP LT 130 MM HG: CPT | Performed by: STUDENT IN AN ORGANIZED HEALTH CARE EDUCATION/TRAINING PROGRAM

## 2025-06-09 PROCEDURE — 99214 OFFICE O/P EST MOD 30 MIN: CPT | Performed by: STUDENT IN AN ORGANIZED HEALTH CARE EDUCATION/TRAINING PROGRAM

## 2025-06-09 PROCEDURE — 1159F MED LIST DOCD IN RCRD: CPT | Performed by: STUDENT IN AN ORGANIZED HEALTH CARE EDUCATION/TRAINING PROGRAM

## 2025-06-09 PROCEDURE — G8427 DOCREV CUR MEDS BY ELIG CLIN: HCPCS | Performed by: STUDENT IN AN ORGANIZED HEALTH CARE EDUCATION/TRAINING PROGRAM

## 2025-06-09 PROCEDURE — 1123F ACP DISCUSS/DSCN MKR DOCD: CPT | Performed by: STUDENT IN AN ORGANIZED HEALTH CARE EDUCATION/TRAINING PROGRAM

## 2025-06-09 PROCEDURE — 3078F DIAST BP <80 MM HG: CPT | Performed by: STUDENT IN AN ORGANIZED HEALTH CARE EDUCATION/TRAINING PROGRAM

## 2025-06-09 PROCEDURE — G8417 CALC BMI ABV UP PARAM F/U: HCPCS | Performed by: STUDENT IN AN ORGANIZED HEALTH CARE EDUCATION/TRAINING PROGRAM

## 2025-06-09 NOTE — PROGRESS NOTES
Have you been to the ER, urgent care clinic since your last visit?  Hospitalized since your last visit?   NO    Have you seen or consulted any other health care providers outside our system since your last visit?   Yes          
experiencing similar symptoms last night, including pressure pain in three teeth, extending to his ear and jaw, accompanied by a severe headache. Despite taking extra strength Tylenol, the headache persists, although it has become more tolerable. He describes the pain as being located behind his eyes. He reports no fevers or chills. His temperature was normal when checked at home. He does not recall the specific antibiotic prescribed during his previous episode. A pharmacist suggested the possibility of a sinus infection based on his symptoms. He has had a broken tooth repaired three times.    Four months ago, he consulted a neurosurgeon due to hand weakness, which was found to be caused by bone encroachment on his spinal cord. This necessitated a laminectomy to create more space. He reports a challenging recovery period post-surgery, during which he had to relearn basic motor skills such as walking.    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    Review of Systems       Objective   Physical Exam  Physical Exam  Ears: External ear canals and tympanic membranes intact  Mouth/Throat: Oral exam performed, noted caps on teeth, possible soft tissue infection  Respiratory: Clear to auscultation, no wheezing, rales or rhonchi  Cardiovascular: Regular rate and rhythm, no murmurs, rubs, or gallops           Results  Labs   - PSA level: 1.4      The patient (or guardian, if applicable) and other individuals in attendance with the patient were advised that Artificial Intelligence will be utilized during this visit to record, process the conversation to generate a clinical note, and support improvement of the AI technology. The patient (or guardian, if applicable) and other individuals in attendance at the appointment consented

## 2025-07-08 ENCOUNTER — OFFICE VISIT (OUTPATIENT)
Dept: PRIMARY CARE CLINIC | Facility: CLINIC | Age: 75
End: 2025-07-08

## 2025-07-08 VITALS
WEIGHT: 214 LBS | RESPIRATION RATE: 16 BRPM | SYSTOLIC BLOOD PRESSURE: 110 MMHG | DIASTOLIC BLOOD PRESSURE: 70 MMHG | HEIGHT: 72 IN | BODY MASS INDEX: 28.99 KG/M2 | HEART RATE: 84 BPM | OXYGEN SATURATION: 97 %

## 2025-07-08 DIAGNOSIS — I10 ESSENTIAL (PRIMARY) HYPERTENSION: ICD-10-CM

## 2025-07-08 DIAGNOSIS — E78.2 MIXED HYPERLIPIDEMIA: ICD-10-CM

## 2025-07-08 DIAGNOSIS — F41.1 GAD (GENERALIZED ANXIETY DISORDER): ICD-10-CM

## 2025-07-08 DIAGNOSIS — R42 LIGHTHEADED: ICD-10-CM

## 2025-07-08 DIAGNOSIS — R06.02 SHORTNESS OF BREATH: ICD-10-CM

## 2025-07-08 DIAGNOSIS — E11.9 TYPE 2 DIABETES MELLITUS WITHOUT COMPLICATION, WITHOUT LONG-TERM CURRENT USE OF INSULIN (HCC): Primary | ICD-10-CM

## 2025-07-08 ASSESSMENT — ENCOUNTER SYMPTOMS: SHORTNESS OF BREATH: 1

## 2025-07-08 NOTE — PROGRESS NOTES
Have you been to the ER, urgent care clinic since your last visit?  Hospitalized since your last visit?   NO    Have you seen or consulted any other health care providers outside our system since your last visit?   NO  Chief Complaint   Patient presents with    Follow-up       /70 (BP Site: Right Upper Arm, Patient Position: Sitting, BP Cuff Size: Large Adult)   Pulse 84   Resp 16   Ht 1.829 m (6')   Wt 97.1 kg (214 lb)   SpO2 97%   BMI 29.02 kg/m²

## 2025-07-08 NOTE — PROGRESS NOTES
Misael Calero is a 75 y.o. male presents for    Chief Complaint   Patient presents with    Follow-up    .    ASSESSMENT and PLAN  Misael was seen today for follow-up.    Diagnoses and all orders for this visit:    Type 2 diabetes mellitus without complication, without long-term current use of insulin (HCC)  Comments:  a1c has been controlled on metformin    Essential (primary) hypertension  Comments:  BP is well controlled.    DARLYN (generalized anxiety disorder)  Comments:  well controlled on sertraline.    Mixed hyperlipidemia  Comments:  lipids are at goal.    Lightheaded  Comments:  improving after his neck surgery 6 months ago.  Orders:  -     Mylene Gabriel MD, Cardiology, [place]    Shortness of breath  Comments:  will refer to cardiology  red flag sx reviewed, RTC precautions advised.  Orders:  -     Mylene Gabriel MD, Cardiology, [place]         Will check labs next visit.    HISTORY OF PRESENT ILLNESS  Misael Calero is a 75 y.o. male presents for    Chief Complaint   Patient presents with    Follow-up    .    HTN: Blood pressure is well controlled on current medications. Compliant with medications.      Type 2 DM: A1c has been well controlled on metformin.   Hemoglobin A1C   Date Value Ref Range Status   03/05/2025 6.2 (H) 4.8 - 5.6 % Final     Comment:                 Prediabetes: 5.7 - 6.4           Diabetes: >6.4           Glycemic control for adults with diabetes: <7.0          DARLYN: patient reports his anxiety is well controlled on the sertraline 25mg. States he feels this works great for him.     Patient reports he notices when he gets up from a chair or stands up too quickly he will get dizzy. He denies syncope. He will usually hold on to something and after about 10-15 seconds the dizziness goes away. Reports this was going on before his neck surgery in December and actually caused him to pass out twice due to the dizziness, but he reports he has noticed it has gotten better since

## 2025-07-11 DIAGNOSIS — I10 ESSENTIAL (PRIMARY) HYPERTENSION: ICD-10-CM

## 2025-07-11 DIAGNOSIS — E78.2 MIXED HYPERLIPIDEMIA: ICD-10-CM

## 2025-07-13 RX ORDER — ATORVASTATIN CALCIUM 80 MG/1
80 TABLET, FILM COATED ORAL NIGHTLY
Qty: 90 TABLET | Refills: 1 | Status: SHIPPED | OUTPATIENT
Start: 2025-07-13

## 2025-07-13 RX ORDER — LOSARTAN POTASSIUM AND HYDROCHLOROTHIAZIDE 25; 100 MG/1; MG/1
1 TABLET ORAL DAILY
Qty: 90 TABLET | Refills: 1 | Status: SHIPPED | OUTPATIENT
Start: 2025-07-13

## 2025-08-01 DIAGNOSIS — I10 ESSENTIAL (PRIMARY) HYPERTENSION: ICD-10-CM

## 2025-08-01 DIAGNOSIS — E11.9 TYPE 2 DIABETES MELLITUS WITHOUT COMPLICATION, WITHOUT LONG-TERM CURRENT USE OF INSULIN (HCC): ICD-10-CM

## 2025-08-01 RX ORDER — AMLODIPINE BESYLATE 10 MG/1
10 TABLET ORAL DAILY
Qty: 90 TABLET | Refills: 1 | Status: SHIPPED | OUTPATIENT
Start: 2025-08-01

## (undated) DEVICE — MAGNETIC INSTR DRAPE 20X16: Brand: MEDLINE INDUSTRIES, INC.

## (undated) DEVICE — TRANSFER SET 3": Brand: MEDLINE INDUSTRIES, INC.

## (undated) DEVICE — SOLUTION SURG PREP 26 CC PURPREP

## (undated) DEVICE — LAMINECTOMY-MRMC: Brand: MEDLINE INDUSTRIES, INC.

## (undated) DEVICE — MARKER,SKIN,WI/RULER AND LABELS: Brand: MEDLINE

## (undated) DEVICE — SUTURE VICRYL + ABSRB L18IN 0 NDL L36MM OS6 VLT 1/2 CIR REV CUT

## (undated) DEVICE — SUTURE VICRYL + SZ 30 L18IN ABSRB UD CP2 L26MM 1/2 CIR REV

## (undated) DEVICE — FLOSEAL HEMOSTATIC MATRIX, 5 ML: Brand: FLOSEAL

## (undated) DEVICE — MEDIUM BLADE: Brand: MILL PLUS

## (undated) DEVICE — COLLAR CERV L H3XL18IN COT M DENS FOAM BRTH ADJ LO-CONTOUR

## (undated) DEVICE — 450 ML BOTTLE OF 0.05% CHLORHEXIDINE GLUCONATE IN 99.95% STERILE WATER FOR IRRIGATION, USP AND APPLICATOR.: Brand: IRRISEPT ANTIMICROBIAL WOUND LAVAGE

## (undated) DEVICE — DRILL SURG 3.5 MM PC2110135

## (undated) DEVICE — AEGIS 1" DISK 4MM HOLE, PEEL OPEN: Brand: MEDLINE

## (undated) DEVICE — FLOSEAL MATRIX IS INDICATED IN SURGICAL PROCEDURES (OTHER THAN IN OPHTHALMIC) AS AN ADJUNCT TO HEMOSTASIS WHEN CONTROL OF BLEEDING BY LIGATURE OR CONVENTIONALPROCEDURES IS INEFFECTIVE OR IMPRACTICAL.: Brand: FLOSEAL HEMOSTATIC MATRIX

## (undated) DEVICE — CARTRIDGE: Brand: PREP PLUS

## (undated) DEVICE — KIT EVAC 400CC DIA1/8IN H PAT 12.5IN 3 SPR RND SHP PVC DRN

## (undated) DEVICE — GLOVE ORANGE PI 7 1/2   MSG9075

## (undated) DEVICE — SUTURE ETHILON SZ 2-0 L18IN NONABSORBABLE BLK L19MM PS-2 PRIM 593H

## (undated) DEVICE — HYPODERMIC SAFETY NEEDLE: Brand: MAGELLAN

## (undated) DEVICE — TUBING, SUCTION, 1/4" X 10', STRAIGHT: Brand: MEDLINE

## (undated) DEVICE — SNAP KOVER: Brand: UNBRANDED

## (undated) DEVICE — 3M™ TEGADERM™ TRANSPARENT FILM DRESSING FRAME STYLE, 1626W, 4 IN X 4-3/4 IN (10 CM X 12 CM), 50/CT 4CT/CASE: Brand: 3M™ TEGADERM™

## (undated) DEVICE — TOOL MR8-14MH30 MR8 14CM MATCH 3MM: Brand: MIDAS REX MR8

## (undated) DEVICE — SOLUTION IRRIG 1000ML 0.9% SOD CHL USP POUR PLAS BTL

## (undated) DEVICE — 1LYRTR 16FR10ML100%SIL UMS SNP: Brand: MEDLINE INDUSTRIES, INC.

## (undated) DEVICE — SOLUTION IRRIG 1000ML STRL H2O USP PLAS POUR BTL